# Patient Record
Sex: FEMALE | Race: WHITE | NOT HISPANIC OR LATINO | Employment: UNEMPLOYED | URBAN - METROPOLITAN AREA
[De-identification: names, ages, dates, MRNs, and addresses within clinical notes are randomized per-mention and may not be internally consistent; named-entity substitution may affect disease eponyms.]

---
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
## 2021-02-17 ENCOUNTER — TELEPHONE (OUTPATIENT)
Dept: PSYCHIATRY | Facility: CLINIC | Age: 21
End: 2021-02-17

## 2021-02-17 ENCOUNTER — OFFICE VISIT (OUTPATIENT)
Dept: PEDIATRICS CLINIC | Facility: CLINIC | Age: 21
End: 2021-02-17
Payer: COMMERCIAL

## 2021-02-17 VITALS
WEIGHT: 160 LBS | RESPIRATION RATE: 16 BRPM | SYSTOLIC BLOOD PRESSURE: 110 MMHG | DIASTOLIC BLOOD PRESSURE: 56 MMHG | HEIGHT: 63 IN | BODY MASS INDEX: 28.35 KG/M2 | HEART RATE: 92 BPM

## 2021-02-17 DIAGNOSIS — F41.9 ANXIETY: ICD-10-CM

## 2021-02-17 DIAGNOSIS — N94.6 DYSMENORRHEA: ICD-10-CM

## 2021-02-17 PROCEDURE — 3725F SCREEN DEPRESSION PERFORMED: CPT | Performed by: PEDIATRICS

## 2021-02-17 PROCEDURE — 96127 BRIEF EMOTIONAL/BEHAV ASSMT: CPT | Performed by: PEDIATRICS

## 2021-02-17 PROCEDURE — 1036F TOBACCO NON-USER: CPT | Performed by: PEDIATRICS

## 2021-02-17 PROCEDURE — 99214 OFFICE O/P EST MOD 30 MIN: CPT | Performed by: PEDIATRICS

## 2021-02-17 PROCEDURE — 3008F BODY MASS INDEX DOCD: CPT | Performed by: PEDIATRICS

## 2021-02-17 RX ORDER — DROSPIRENONE AND ETHINYL ESTRADIOL 0.02-3(28)
1 KIT ORAL DAILY
Qty: 28 TABLET | Refills: 1 | Status: SHIPPED | OUTPATIENT
Start: 2021-02-17 | End: 2021-04-25 | Stop reason: SDUPTHER

## 2021-02-17 RX ORDER — FLUOXETINE HYDROCHLORIDE 20 MG/1
20 CAPSULE ORAL DAILY
Qty: 30 CAPSULE | Refills: 0 | Status: CANCELLED
Start: 2021-02-17 | End: 2021-03-19

## 2021-02-17 NOTE — PATIENT INSTRUCTIONS
Prozac 20 mg, I would like to switch to zoloft 50 mg initially  For 3 days, please pour contents of half a capsule of prozac in soft food, and simulanteously take 1/2 zoloft pill  Then full zoloft pill and for 3 more days, 1/4 of the capsule once daily, then stop  If after 1-2 weeks your'e not feeling better, Zoloft to 100 mg is very appropriate

## 2021-02-20 NOTE — PROGRESS NOTES
Assessment/Plan:    Diagnoses and all orders for this visit:    Anxiety  -     sertraline (ZOLOFT) 50 mg tablet; Take 1 tablet (50 mg total) by mouth daily for 14 days    Dysmenorrhea  -     drospirenone-ethinyl estradiol (MARIUSZ) 3-0 02 MG per tablet; Take 1 tablet by mouth daily for 28 days    Other orders  -     Cancel: FLUoxetine (PROzac) 20 mg capsule; Take 1 capsule (20 mg total) by mouth daily          Patient Instructions   Prozac 20 mg, I would like to switch to zoloft 50 mg initially  For 3 days, please pour contents of half a capsule of prozac in soft food, and simulanteously take 1/2 zoloft pill  Then full zoloft pill and for 3 more days, 1/4 of the capsule once daily, then stop  If after 1-2 weeks your'e not feeling better, Zoloft to 100 mg is very appropriate  Subjective:     History provided by: patient    Patient ID: Brenda Toure is a 24 y o  female    Performed H &P with Delroy Curry alone  Two concerns today  OCP - on Orsythia since 2018 so going on 3 years, periods are starting to be painful again , cramping for 3-5 days very badly  Panic attacks - school (classes at [employer]) virtual are still a huge trigger  Was a little better initially with the prozac, now 20 mg, now not and starting to happen again  Denies suicidality or cutting  Fatigue is better, diet and exercise and sleep habits OK       The following portions of the patient's history were reviewed and updated as appropriate:   She  has no past medical history on file  She   Patient Active Problem List    Diagnosis Date Noted    Panic attack 07/16/2020    Anxiety 07/16/2020    Fatigue 07/16/2020     She  has a past surgical history that includes No past surgeries  Her family history includes Allergies in her family; Anemia in her mother; Asthma in her mother; Cancer in her family; Diabetes in her family; Heart disease in her family; Hypertension in her family;  No Known Problems in her father; Stroke in her family  She  reports that she has never smoked  She has never used smokeless tobacco  No history on file for alcohol and drug  Current Outpatient Medications   Medication Sig Dispense Refill    drospirenone-ethinyl estradiol (MARIUSZ) 3-0 02 MG per tablet Take 1 tablet by mouth daily for 28 days 28 tablet 1    Orsythia 0 1-20 MG-MCG per tablet TAKE ONE TABLET BY MOUTH EVERY  tablet 3    sertraline (ZOLOFT) 50 mg tablet Take 1 tablet (50 mg total) by mouth daily for 14 days 14 tablet 0     No current facility-administered medications for this visit  Current Outpatient Medications on File Prior to Visit   Medication Sig    Orsythia 0 1-20 MG-MCG per tablet TAKE ONE TABLET BY MOUTH EVERY DAY     No current facility-administered medications on file prior to visit  She has No Known Allergies       Review of Systems   Constitutional: Negative for activity change, appetite change, fatigue and fever  HENT: Negative for mouth sores  Eyes: Negative for discharge  Respiratory: Negative for shortness of breath  Gastrointestinal: Negative for diarrhea and vomiting  Genitourinary: Positive for menstrual problem  Musculoskeletal: Negative for arthralgias  Skin: Negative for rash  Psychiatric/Behavioral: Negative for sleep disturbance  The patient is nervous/anxious  All other systems reviewed and are negative  Objective:    Vitals:    02/17/21 1643   BP: 110/56   Pulse: 92   Resp: 16   Weight: 72 6 kg (160 lb)   Height: 5' 3 39" (1 61 m)       Physical Exam  Constitutional:       General: She is not in acute distress  Appearance: She is well-developed  HENT:      Head: Normocephalic  Eyes:      General:         Right eye: No discharge  Left eye: No discharge  Conjunctiva/sclera: Conjunctivae normal    Neck:      Musculoskeletal: Neck supple  Cardiovascular:      Rate and Rhythm: Normal rate and regular rhythm  Heart sounds: Normal heart sounds  No murmur  Pulmonary:      Effort: Pulmonary effort is normal  No respiratory distress  Breath sounds: Normal breath sounds  Abdominal:      Palpations: Abdomen is soft  Musculoskeletal: Normal range of motion  Lymphadenopathy:      Cervical: No cervical adenopathy  Skin:     Findings: No rash  Neurological:      Mental Status: She is alert and oriented to person, place, and time     Psychiatric:         Behavior: Behavior normal          Judgment: Judgment normal         I was in the room face to face with the patient  from 4:45 PM to 5:15 PM, 25 minutes were spent counselling

## 2021-02-28 DIAGNOSIS — F41.9 ANXIETY: ICD-10-CM

## 2021-03-23 DIAGNOSIS — F41.9 ANXIETY: ICD-10-CM

## 2021-04-25 DIAGNOSIS — F41.9 ANXIETY: ICD-10-CM

## 2021-04-25 DIAGNOSIS — N94.6 DYSMENORRHEA: ICD-10-CM

## 2021-04-26 RX ORDER — DROSPIRENONE AND ETHINYL ESTRADIOL 0.02-3(28)
1 KIT ORAL DAILY
Qty: 28 TABLET | Refills: 0 | Status: SHIPPED | OUTPATIENT
Start: 2021-04-26 | End: 2021-05-13 | Stop reason: SDUPTHER

## 2021-05-08 ENCOUNTER — TELEPHONE (OUTPATIENT)
Dept: PEDIATRICS CLINIC | Facility: CLINIC | Age: 21
End: 2021-05-08

## 2021-05-13 DIAGNOSIS — N94.6 DYSMENORRHEA: ICD-10-CM

## 2021-05-13 RX ORDER — DROSPIRENONE AND ETHINYL ESTRADIOL 0.02-3(28)
1 KIT ORAL DAILY
Qty: 84 TABLET | Refills: 0 | Status: SHIPPED | OUTPATIENT
Start: 2021-05-13 | End: 2021-07-14 | Stop reason: SDUPTHER

## 2021-05-20 DIAGNOSIS — F41.9 ANXIETY: ICD-10-CM

## 2021-07-14 DIAGNOSIS — N94.6 DYSMENORRHEA: ICD-10-CM

## 2021-07-14 DIAGNOSIS — F41.9 ANXIETY: ICD-10-CM

## 2021-07-14 RX ORDER — DROSPIRENONE AND ETHINYL ESTRADIOL 0.02-3(28)
1 KIT ORAL DAILY
Qty: 84 TABLET | Refills: 0 | OUTPATIENT
Start: 2021-07-14 | End: 2021-10-06

## 2021-07-14 NOTE — TELEPHONE ENCOUNTER
I advised Chele Strong that I was pretty positive all her meds have been refilled recently and she stated she did not think so

## 2021-07-14 NOTE — TELEPHONE ENCOUNTER
It seems I sent 3 month supply back in May  I will , this time, send 3 month supply WITH 3 refills for each if you could please let Chika Howard know  Thanks

## 2021-08-12 ENCOUNTER — TELEPHONE (OUTPATIENT)
Dept: OBGYN CLINIC | Facility: CLINIC | Age: 21
End: 2021-08-12

## 2021-08-12 ENCOUNTER — OFFICE VISIT (OUTPATIENT)
Dept: OBGYN CLINIC | Facility: CLINIC | Age: 21
End: 2021-08-12

## 2021-08-12 VITALS
SYSTOLIC BLOOD PRESSURE: 117 MMHG | WEIGHT: 172 LBS | HEART RATE: 94 BPM | HEIGHT: 63 IN | BODY MASS INDEX: 30.48 KG/M2 | DIASTOLIC BLOOD PRESSURE: 78 MMHG

## 2021-08-12 DIAGNOSIS — Z30.09 GENERAL COUNSELING AND ADVICE ON FEMALE CONTRACEPTION: ICD-10-CM

## 2021-08-12 DIAGNOSIS — Z11.3 SCREEN FOR STD (SEXUALLY TRANSMITTED DISEASE): ICD-10-CM

## 2021-08-12 DIAGNOSIS — Z30.41 ENCOUNTER FOR SURVEILLANCE OF CONTRACEPTIVE PILLS: Primary | ICD-10-CM

## 2021-08-12 DIAGNOSIS — N94.6 DYSMENORRHEA: ICD-10-CM

## 2021-08-12 PROCEDURE — 87591 N.GONORRHOEAE DNA AMP PROB: CPT | Performed by: NURSE PRACTITIONER

## 2021-08-12 PROCEDURE — 99202 OFFICE O/P NEW SF 15 MIN: CPT | Performed by: NURSE PRACTITIONER

## 2021-08-12 PROCEDURE — 87491 CHLMYD TRACH DNA AMP PROBE: CPT | Performed by: NURSE PRACTITIONER

## 2021-08-12 RX ORDER — DROSPIRENONE AND ETHINYL ESTRADIOL 0.02-3(28)
1 KIT ORAL DAILY
Qty: 84 TABLET | Refills: 11 | Status: SHIPPED | OUTPATIENT
Start: 2021-08-12 | End: 2022-08-09

## 2021-08-12 NOTE — PATIENT INSTRUCTIONS
Carole Ruiz as previously directed  STD results can take up to 2 weeks  Remember safe sex and condom use  Call with needs or concerns  Return in 1 year    COVID-19 Instructions    If you are having any of the following:  Cough   Shortness of breath   Fever  If traveled within past 2 weeks internationally or to high risk [place] states  Or been in contact with someone that has     Please call either:   Your PCP office  [phone], option [phone]    They will screen you over the phone and direct you to the nearest appropriate testing location    DO NOT go to your PCP or OB office without calling first

## 2021-08-12 NOTE — LETTER
2021    To Zac Biswas  : 2000      This letter is to advise you that your recent CULTURE results were reviewed by me and are NORMAL  Please contact the office for an appointment if you have any additional concerns      RAFY Pitts

## 2021-08-12 NOTE — PROGRESS NOTES
Assessment/Plan:         Diagnoses and all orders for this visit:    Encounter for surveillance of contraceptive pills  -     drospirenone-ethinyl estradiol (MARIUSZ) 3-0 02 MG per tablet; Take 1 tablet by mouth daily    General counseling and advice on female contraception    Dysmenorrhea  -     drospirenone-ethinyl estradiol (MARIUSZ) 3-0 02 MG per tablet; Take 1 tablet by mouth daily    Screen for STD (sexually transmitted disease)  -     Chlamydia/GC amplified DNA by Beckie Downey as previously directed  STD results can take up to 2 weeks  Remember safe sex and condom use  Call with needs or concerns  Return in 1 year  Pt verbalized understanding of all discussed  Subjective:      Patient ID: Katie Yu is a 24 y o  female  HPI  Pt states she presents as areferral from [address] [place] [address] Road to establish care and to continue birth control pills  Pt states she is taking Mariusz for a Hx of painful periods and for birth control  Pt states period are only 4-5 days and much less painful, pt would like to continue the method, safe and effective use reinforced    Reinforced safe sex and condom use  Pt has 1 partner x 4 months     The following portions of the patient's history were reviewed and updated as appropriate: allergies, current medications, past family history, past medical history, past social history, past surgical history and problem list     Review of Systems      Pertinent items are note in the HPI    Objective:      /78 (BP Location: Left arm, Patient Position: Sitting, Cuff Size: Standard)   Pulse 94   Ht 5' 3 39" (1 61 m)   Wt 78 kg (172 lb)   LMP 07/19/2021   BMI 30 09 kg/m²          Physical Exam  Vitals reviewed  Constitutional:       Appearance: Normal appearance  Eyes:      General:         Right eye: No discharge  Left eye: No discharge  Pulmonary:      Effort: Pulmonary effort is normal  No respiratory distress     Musculoskeletal:         General: Normal range of motion  Cervical back: Normal range of motion  Neurological:      Mental Status: She is alert and oriented to person, place, and time  Psychiatric:         Mood and Affect: Mood normal          Behavior: Behavior normal          Thought Content:  Thought content normal        Negative cough or SOB

## 2021-08-14 LAB
C TRACH DNA SPEC QL NAA+PROBE: NEGATIVE
N GONORRHOEA DNA SPEC QL NAA+PROBE: NEGATIVE

## 2021-08-19 DIAGNOSIS — Z30.41 ENCOUNTER FOR SURVEILLANCE OF CONTRACEPTIVE PILLS: Primary | ICD-10-CM

## 2021-08-19 NOTE — PROGRESS NOTES
Re: coverage for OCPs (all of the names listed below are chemically equivalent):    I called [facility] and they do not cover the following "brand names"  -Aundrea Do Yusef [address]    They do cover the following "brand names"  -Diamond Armenta (recently rebranded as Thea Gosselin, not for sale as Genevieve Rodríguez anymore)  -[address] [facility] "drosperinone-Ethinyl Estradiol 3-0 02mg"    I called [facility] and explained the situation  They do not have any of the "approved" medications on the shelf today but they will put in a request from their supplier until they find a brand name from the approved list that can be shipped to them  Shipping happens daily and new deliveries occur around 3-4 pm  Therefore we should call back tomorrow () after 3-4 pm and ask if they had any luck getting a shipment with one of the approved names from the list above      [facility]: [phone]

## 2021-09-30 ENCOUNTER — OFFICE VISIT (OUTPATIENT)
Dept: PEDIATRICS CLINIC | Facility: CLINIC | Age: 21
End: 2021-09-30
Payer: COMMERCIAL

## 2021-09-30 VITALS
DIASTOLIC BLOOD PRESSURE: 68 MMHG | SYSTOLIC BLOOD PRESSURE: 118 MMHG | WEIGHT: 177 LBS | BODY MASS INDEX: 31.36 KG/M2 | RESPIRATION RATE: 24 BRPM | HEART RATE: 80 BPM | HEIGHT: 63 IN

## 2021-09-30 DIAGNOSIS — Z13.0 SCREENING FOR DEFICIENCY ANEMIA: ICD-10-CM

## 2021-09-30 DIAGNOSIS — Z13.228 SCREENING FOR METABOLIC DISORDER: ICD-10-CM

## 2021-09-30 DIAGNOSIS — Z13.220 SCREENING FOR HYPERLIPIDEMIA: ICD-10-CM

## 2021-09-30 DIAGNOSIS — Z00.00 ENCOUNTER FOR WELL ADULT EXAM WITHOUT ABNORMAL FINDINGS: Primary | ICD-10-CM

## 2021-09-30 DIAGNOSIS — Z13.31 SCREENING FOR DEPRESSION: ICD-10-CM

## 2021-09-30 PROCEDURE — 99395 PREV VISIT EST AGE 18-39: CPT | Performed by: PEDIATRICS

## 2021-09-30 PROCEDURE — 96127 BRIEF EMOTIONAL/BEHAV ASSMT: CPT | Performed by: PEDIATRICS

## 2021-09-30 PROCEDURE — 92551 PURE TONE HEARING TEST AIR: CPT | Performed by: PEDIATRICS

## 2021-09-30 PROCEDURE — 99173 VISUAL ACUITY SCREEN: CPT | Performed by: PEDIATRICS

## 2021-11-24 ENCOUNTER — OFFICE VISIT (OUTPATIENT)
Dept: PEDIATRICS CLINIC | Facility: CLINIC | Age: 21
End: 2021-11-24
Payer: COMMERCIAL

## 2021-11-24 VITALS
DIASTOLIC BLOOD PRESSURE: 64 MMHG | RESPIRATION RATE: 16 BRPM | HEART RATE: 96 BPM | BODY MASS INDEX: 32.17 KG/M2 | SYSTOLIC BLOOD PRESSURE: 106 MMHG | TEMPERATURE: 98.4 F | WEIGHT: 181.8 LBS

## 2021-11-24 DIAGNOSIS — Z20.818 EXPOSURE TO STREP THROAT: Primary | ICD-10-CM

## 2021-11-24 DIAGNOSIS — J02.9 SORE THROAT: ICD-10-CM

## 2021-11-24 LAB — S PYO AG THROAT QL: NEGATIVE

## 2021-11-24 PROCEDURE — 1036F TOBACCO NON-USER: CPT | Performed by: PEDIATRICS

## 2021-11-24 PROCEDURE — 87070 CULTURE OTHR SPECIMN AEROBIC: CPT | Performed by: PEDIATRICS

## 2021-11-24 PROCEDURE — 87880 STREP A ASSAY W/OPTIC: CPT | Performed by: PEDIATRICS

## 2021-11-24 PROCEDURE — 99214 OFFICE O/P EST MOD 30 MIN: CPT | Performed by: PEDIATRICS

## 2021-11-24 RX ORDER — AMOXICILLIN 500 MG/1
500 CAPSULE ORAL EVERY 8 HOURS SCHEDULED
Qty: 30 CAPSULE | Refills: 0 | Status: SHIPPED | OUTPATIENT
Start: 2021-11-24 | End: 2021-12-04

## 2021-11-26 LAB — BACTERIA THROAT CULT: NORMAL

## 2022-02-22 ENCOUNTER — OFFICE VISIT (OUTPATIENT)
Dept: PEDIATRICS CLINIC | Facility: CLINIC | Age: 22
End: 2022-02-22
Payer: MEDICARE

## 2022-02-22 VITALS
DIASTOLIC BLOOD PRESSURE: 72 MMHG | HEART RATE: 92 BPM | BODY MASS INDEX: 34.38 KG/M2 | WEIGHT: 194 LBS | SYSTOLIC BLOOD PRESSURE: 110 MMHG | RESPIRATION RATE: 20 BRPM | HEIGHT: 63 IN

## 2022-02-22 DIAGNOSIS — F41.9 ANXIETY: ICD-10-CM

## 2022-02-22 PROCEDURE — 99213 OFFICE O/P EST LOW 20 MIN: CPT | Performed by: PEDIATRICS

## 2022-02-22 NOTE — PROGRESS NOTES
Assessment/Plan:    Diagnoses and all orders for this visit:    Anxiety  -     sertraline (ZOLOFT) 50 mg tablet; Take 2 tablets (100 mg total) by mouth daily          Patient Instructions   Gayrortega Cotto, I am sorry you are feeling stress  Panic attacks  Let's increase Zoloft to 100 milligrams , I have sent #60 with 1 refill to the pharmacy  IF after 7 days you still have the attacks, very safe to increase to 150   Subjective:     History provided by: patient    Patient ID: Jeffry Mike is a 22 y o  female    Gary Cotto is having panic attacks , more recently more than usual    Related to applying to nursing school  1-2 times a week , [employer] work is stressful , "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 "      Hear in March about [employer] nursing school       The following portions of the patient's history were reviewed and updated as appropriate:   She  has no past medical history on file  She   Patient Active Problem List    Diagnosis Date Noted    Panic attack 07/16/2020    Anxiety 07/16/2020    Fatigue 07/16/2020     She  has a past surgical history that includes No past surgeries  Her family history includes Allergies in her family; Anemia in her mother; Asthma in her mother; Cancer in her family; Diabetes in her family; Heart disease in her family; Hypertension in her family; No Known Problems in her father; Stroke in her family  She  reports that she has never smoked  She has never used smokeless tobacco  No history on file for alcohol use and drug use  Current Outpatient Medications   Medication Sig Dispense Refill    drospirenone-ethinyl estradiol (MARIUSZ) 3-0 02 MG per tablet Take 1 tablet by mouth daily 84 tablet 11    sertraline (ZOLOFT) 50 mg tablet Take 2 tablets (100 mg total) by mouth daily 60 tablet 1     No current facility-administered medications for this visit       Current Outpatient Medications on File Prior to Visit   Medication Sig    drospirenone-ethinyl estradiol (MARIUSZ) 3-0 02 MG per tablet Take 1 tablet by mouth daily     No current facility-administered medications on file prior to visit  She has No Known Allergies       Review of Systems   Constitutional: Negative for activity change, appetite change, fatigue and fever  HENT: Negative for mouth sores  Eyes: Negative for discharge  Respiratory: Negative for shortness of breath  Gastrointestinal: Negative for diarrhea and vomiting  Musculoskeletal: Negative for arthralgias  Skin: Negative for rash  Psychiatric/Behavioral: Positive for dysphoric mood  Negative for self-injury, sleep disturbance and suicidal ideas  The patient is nervous/anxious  All other systems reviewed and are negative  Objective:    Vitals:    02/22/22 1709   BP: 110/72   BP Location: Left arm   Patient Position: Sitting   Pulse: 92   Resp: 20   Weight: 88 kg (194 lb)   Height: 5' 3 03" (1 601 m)       Physical Exam  Constitutional:       Appearance: She is well-developed  HENT:      Head: Normocephalic  Eyes:      Conjunctiva/sclera: Conjunctivae normal    Pulmonary:      Effort: Pulmonary effort is normal    Abdominal:      General: There is no distension  Musculoskeletal:         General: Normal range of motion  Cervical back: Normal range of motion  Skin:     Findings: No rash  Neurological:      Mental Status: She is alert and oriented to person, place, and time

## 2022-02-22 NOTE — PATIENT INSTRUCTIONS
Pratima Salamanca am sorry you are feeling stress  Panic attacks  Let's increase Zoloft to 100 milligrams , I have sent #60 with 1 refill to the pharmacy  IF after 7 days you still have the attacks, very safe to increase to 150

## 2022-03-29 DIAGNOSIS — F41.9 ANXIETY: ICD-10-CM

## 2022-03-31 DIAGNOSIS — F41.9 ANXIETY: Primary | ICD-10-CM

## 2022-03-31 RX ORDER — SERTRALINE HYDROCHLORIDE 100 MG/1
100 TABLET, FILM COATED ORAL DAILY
Qty: 90 TABLET | Refills: 0 | Status: SHIPPED | OUTPATIENT
Start: 2022-03-31 | End: 2022-06-13

## 2022-04-06 ENCOUNTER — OFFICE VISIT (OUTPATIENT)
Dept: PEDIATRICS CLINIC | Facility: CLINIC | Age: 22
End: 2022-04-06
Payer: MEDICARE

## 2022-04-06 VITALS
HEART RATE: 84 BPM | SYSTOLIC BLOOD PRESSURE: 100 MMHG | DIASTOLIC BLOOD PRESSURE: 60 MMHG | BODY MASS INDEX: 33.91 KG/M2 | WEIGHT: 191.4 LBS | RESPIRATION RATE: 16 BRPM | HEIGHT: 63 IN

## 2022-04-06 DIAGNOSIS — Z02.89 HISTORY AND PHYSICAL EXAMINATION, OCCUPATION: Primary | ICD-10-CM

## 2022-04-06 PROCEDURE — 99212 OFFICE O/P EST SF 10 MIN: CPT | Performed by: PEDIATRICS

## 2022-04-08 NOTE — PATIENT INSTRUCTIONS
Sima Jacobsen on your admission into [address] ! I am glad that you are tolerating the medication well as well       Please reach out if you need anything else

## 2022-04-08 NOTE — PROGRESS NOTES
Assessment/Plan:    There are no diagnoses linked to this encounter  Patient Instructions   Louie Hill, congratulations on your admission into [facility] ! I am glad that you are tolerating the medication well as well   Please reach out if you need anything else            Subjective:     History provided by: patient    Patient ID: Manuelito Roman is a 22 y o  female    Is up to date on immunizations , here for H&P for [employer] [facility] [employer]!!!   "I need a TB serum test but they told me to go through [facility] "    No concerns today      The following portions of the patient's history were reviewed and updated as appropriate:   She  has no past medical history on file  She   Patient Active Problem List    Diagnosis Date Noted    Panic attack 07/16/2020    Anxiety 07/16/2020    Fatigue 07/16/2020     She  has a past surgical history that includes No past surgeries  Her family history includes Allergies in her family; Anemia in her mother; Asthma in her mother; Cancer in her family; Diabetes in her family; Heart disease in her family; Hypertension in her family; No Known Problems in her father; Stroke in her family  She  reports that she has never smoked  She has never used smokeless tobacco  No history on file for alcohol use and drug use  Current Outpatient Medications   Medication Sig Dispense Refill    drospirenone-ethinyl estradiol (MARIUSZ) 3-0 02 MG per tablet Take 1 tablet by mouth daily 84 tablet 11    sertraline (Zoloft) 100 mg tablet Take 1 tablet (100 mg total) by mouth daily 90 tablet 0     No current facility-administered medications for this visit  Current Outpatient Medications on File Prior to Visit   Medication Sig    drospirenone-ethinyl estradiol (MARIUSZ) 3-0 02 MG per tablet Take 1 tablet by mouth daily    sertraline (Zoloft) 100 mg tablet Take 1 tablet (100 mg total) by mouth daily     No current facility-administered medications on file prior to visit  She has No Known Allergies       Review of Systems   Constitutional: Negative for activity change, appetite change, fatigue and fever  HENT: Negative for congestion and mouth sores  Eyes: Negative for discharge  Respiratory: Negative for cough and shortness of breath  Gastrointestinal: Negative for diarrhea and vomiting  Musculoskeletal: Negative for arthralgias  Skin: Negative for rash  Psychiatric/Behavioral: Negative for sleep disturbance  All other systems reviewed and are negative  Objective:    Vitals:    04/06/22 1349   BP: 100/60   Pulse: 84   Resp: 16   Weight: 86 8 kg (191 lb 6 4 oz)   Height: 5' 3 11" (1 603 m)       Physical Exam  Constitutional:       Appearance: She is well-developed  HENT:      Head: Normocephalic  Eyes:      Conjunctiva/sclera: Conjunctivae normal    Pulmonary:      Effort: Pulmonary effort is normal    Abdominal:      General: There is no distension  Musculoskeletal:         General: Normal range of motion  Cervical back: Normal range of motion  Skin:     Findings: No rash  Neurological:      Mental Status: She is alert and oriented to person, place, and time

## 2022-04-13 ENCOUNTER — OFFICE VISIT (OUTPATIENT)
Dept: OBGYN CLINIC | Facility: CLINIC | Age: 22
End: 2022-04-13

## 2022-04-13 VITALS
BODY MASS INDEX: 33.31 KG/M2 | DIASTOLIC BLOOD PRESSURE: 83 MMHG | SYSTOLIC BLOOD PRESSURE: 116 MMHG | HEIGHT: 63 IN | HEART RATE: 101 BPM | WEIGHT: 188 LBS

## 2022-04-13 DIAGNOSIS — N92.6 IRREGULAR MENSES: Primary | ICD-10-CM

## 2022-04-13 LAB — SL AMB POCT URINE HCG: NORMAL

## 2022-04-13 PROCEDURE — 81025 URINE PREGNANCY TEST: CPT | Performed by: NURSE PRACTITIONER

## 2022-04-13 PROCEDURE — 99213 OFFICE O/P EST LOW 20 MIN: CPT | Performed by: NURSE PRACTITIONER

## 2022-04-13 NOTE — PROGRESS NOTES
Assessment/Plan:         Diagnoses and all orders for this visit:    Irregular menses  -     POCT urine HCG      Plan  Continue birth control pills as previously prescribed  Call with needs or concerns  Annual GYN exam and PAP are due after 8/21/2022  Pt verbalized understanding of all discussed  Subjective:      Patient ID: Mariah Cortez is a 22 y o  female  HPI  Pt states she has been taking OCP's as directed, had a normal period the end of March and has been having intermittent spotting since    Explained UPT is negative, to continue OCP's as previously directed and to call with continued concerns    The following portions of the patient's history were reviewed and updated as appropriate: allergies, current medications, past family history, past medical history, past social history, past surgical history and problem list     Review of Systems      Pertinent items are note in the HPI    Objective:      /83   Pulse 101   Ht 5' 3" (1 6 m)   Wt 85 3 kg (188 lb)   LMP 03/28/2022   BMI 33 30 kg/m²          Physical Exam  Vitals reviewed  Constitutional:       Appearance: Normal appearance  Eyes:      General:         Right eye: No discharge  Left eye: No discharge  Pulmonary:      Effort: Pulmonary effort is normal  No respiratory distress  Musculoskeletal:         General: Normal range of motion  Cervical back: Normal range of motion  Neurological:      Mental Status: She is alert and oriented to person, place, and time  Psychiatric:         Mood and Affect: Mood normal          Behavior: Behavior normal          Thought Content:  Thought content normal        Negative cough or SOB  Negative UPT

## 2022-04-13 NOTE — PATIENT INSTRUCTIONS
Continue birth control pills as previously prescribed  Call with needs or concerns  Annual GYN exam and PAP are due after 8/21/2022    COVID-19 Instructions    If you are having any of the following:  Cough   Shortness of breath   Fever  If traveled within past 2 weeks internationally or to high risk [place] states  Or been in contact with someone that has     Please call either:   Your PCP office  [phone], option [phone]    They will screen you over the phone and direct you to the nearest appropriate testing location  DO NOT go to your PCP or OB office without calling first     Thank you for your confidence in our team    We appreciate you and welcome your feedback  If you receive a survey from us, please take a few moments to let us know how we are doing     Sincerely,  RAFY Colunga

## 2022-04-18 ENCOUNTER — APPOINTMENT (OUTPATIENT)
Dept: LAB | Facility: CLINIC | Age: 22
End: 2022-04-18

## 2022-04-18 ENCOUNTER — OCCMED (OUTPATIENT)
Dept: URGENT CARE | Facility: CLINIC | Age: 22
End: 2022-04-18

## 2022-04-18 DIAGNOSIS — Z13.9 SCREENING FOR CONDITION: ICD-10-CM

## 2022-04-18 DIAGNOSIS — Z13.9 SCREENING FOR CONDITION: Primary | ICD-10-CM

## 2022-04-18 PROCEDURE — 36415 COLL VENOUS BLD VENIPUNCTURE: CPT

## 2022-04-18 PROCEDURE — 86480 TB TEST CELL IMMUN MEASURE: CPT

## 2022-04-19 LAB
GAMMA INTERFERON BACKGROUND BLD IA-ACNC: 0.02 IU/ML
M TB IFN-G BLD-IMP: NEGATIVE
M TB IFN-G CD4+ BCKGRND COR BLD-ACNC: 0 IU/ML
M TB IFN-G CD4+ BCKGRND COR BLD-ACNC: 0.01 IU/ML
MITOGEN IGNF BCKGRD COR BLD-ACNC: >10 IU/ML

## 2022-04-25 ENCOUNTER — TELEPHONE (OUTPATIENT)
Dept: PSYCHIATRY | Facility: CLINIC | Age: 22
End: 2022-04-25

## 2022-06-11 DIAGNOSIS — F41.9 ANXIETY: ICD-10-CM

## 2022-06-13 RX ORDER — SERTRALINE HYDROCHLORIDE 100 MG/1
TABLET, FILM COATED ORAL
Qty: 90 TABLET | Refills: 0 | Status: SHIPPED | OUTPATIENT
Start: 2022-06-13 | End: 2022-06-27 | Stop reason: SDUPTHER

## 2022-06-13 NOTE — TELEPHONE ENCOUNTER
Attempted to call Florent[facility]'s number on file and it says no routes found  Left message for mom to have Bel Dose call us

## 2022-06-27 ENCOUNTER — TELEMEDICINE (OUTPATIENT)
Dept: PEDIATRICS CLINIC | Facility: CLINIC | Age: 22
End: 2022-06-27
Payer: MEDICARE

## 2022-06-27 DIAGNOSIS — F41.9 ANXIETY: Primary | ICD-10-CM

## 2022-06-27 DIAGNOSIS — G43.829 MENSTRUAL MIGRAINE WITHOUT STATUS MIGRAINOSUS, NOT INTRACTABLE: ICD-10-CM

## 2022-06-27 DIAGNOSIS — Z13.31 ENCOUNTER FOR SCREENING FOR DEPRESSION: ICD-10-CM

## 2022-06-27 PROCEDURE — 99214 OFFICE O/P EST MOD 30 MIN: CPT | Performed by: PEDIATRICS

## 2022-06-27 PROCEDURE — 96127 BRIEF EMOTIONAL/BEHAV ASSMT: CPT | Performed by: PEDIATRICS

## 2022-06-27 RX ORDER — SERTRALINE HYDROCHLORIDE 100 MG/1
100 TABLET, FILM COATED ORAL DAILY
Qty: 90 TABLET | Refills: 1 | Status: SHIPPED | OUTPATIENT
Start: 2022-06-27

## 2022-06-28 NOTE — PATIENT INSTRUCTIONS
I am so proud of you, Ericka Doing, for getting into [facility]! That is wonderful! I have refilled zoloft for you but we have room to increase if anxiety worsens  Please call your Gyn about your menstrual migraines  I have also put in a referral to neurology for these headaches that run in your family  [id number] [facility] or [address] [facility] for adult medicine

## 2022-06-28 NOTE — PROGRESS NOTES
Virtual Regular Visit    Verification of patient location:    Patient is located in the following state in which I hold an active license [place]      Assessment/Plan:    Problem List Items Addressed This Visit        Other    Anxiety - Primary    Relevant Medications    sertraline (ZOLOFT) 100 mg tablet      Other Visit Diagnoses     Menstrual migraine without status migrainosus, not intractable        Relevant Medications    sertraline (ZOLOFT) 100 mg tablet    Other Relevant Orders    Ambulatory Referral to Neurology    Encounter for screening for depression            Patient Instructions   I am so proud of you, Tino Gray, for getting into [facility]! That is wonderful! I have refilled zoloft for you but we have room to increase if anxiety worsens  Please call your Gyn about your menstrual migraines  I have also put in a referral to neurology for these headaches that run in your family  [address] or [address] for adult medicine  Reason for visit is   Chief Complaint   Patient presents with    Virtual Regular Visit        Encounter provider Lucas Blair MD    Provider located at [address] [place] [address] [address] [facility]  [phone]      Recent Visits  No visits were found meeting these conditions  Showing recent visits within past 7 days and meeting all other requirements  Today's Visits  Date Type Provider Dept   06/27/22 Telemedicine MD Daniel Kathleen   Showing today's visits and meeting all other requirements  Future Appointments  No visits were found meeting these conditions  Showing future appointments within next 150 days and meeting all other requirements       The patient was identified by name and date of birth   Anh Mcneill was informed that this is a telemedicine visit and that the visit is being conducted through [facility] and patient was informed this is a secure, HIPAA-complaint platform  She agrees to proceed     My office door was closed  No one else was in the room  She acknowledged consent and understanding of privacy and security of the video platform  The patient has agreed to participate and understands they can discontinue the visit at any time  Patient is aware this is a billable service  Subjective  Maria Fernanda Magana is a 22 y o  female here via video visit to discuss a few issues   Jerrol Bence is doing well in [employer] [employer]! She feels the zoloft at 100mg is helpful, feels less anxious, fewer panic attacks  Even though anxiety is not resolved, she does not want to increase to higher dose at this point  She also gets migraine HAs on first 2 days of her period and needs to stay in a dark, quiet room  She is on OCPs  This runs in her family  She will reach out to her Gyn about this  No HA now  No visual issues  No ass'c vomiting  She is almost 22 and needs to find adult doctor  History reviewed  No pertinent past medical history  Past Surgical History:   Procedure Laterality Date    NO PAST SURGERIES         Current Outpatient Medications   Medication Sig Dispense Refill    sertraline (ZOLOFT) 100 mg tablet Take 1 tablet (100 mg total) by mouth daily 90 tablet 1    drospirenone-ethinyl estradiol (MARIUSZ) 3-0 02 MG per tablet Take 1 tablet by mouth daily 84 tablet 11     No current facility-administered medications for this visit  No Known Allergies    Review of Systems   Constitutional: Negative  Negative for fever  HENT: Negative for dental problem, ear pain, nosebleeds and sore throat  Eyes: Negative for visual disturbance  Respiratory: Negative for cough and shortness of breath  Cardiovascular: Negative for chest pain and palpitations  Gastrointestinal: Negative for abdominal pain, constipation, diarrhea, nausea and vomiting  Endocrine: Negative for polyuria  Genitourinary: Negative for dysuria  Musculoskeletal: Negative for gait problem and myalgias  Skin: Negative for rash  Allergic/Immunologic: Negative for immunocompromised state  Neurological: Positive for headaches  Negative for dizziness and weakness  Hematological: Negative for adenopathy  Psychiatric/Behavioral: Negative for behavioral problems, dysphoric mood, self-injury, sleep disturbance and suicidal ideas  The patient is nervous/anxious  Video Exam    There were no vitals filed for this visit  Physical Exam  Constitutional:       Appearance: Normal appearance  Comments: happy   HENT:      Head: Normocephalic and atraumatic  Right Ear: External ear normal       Left Ear: External ear normal       Nose: Nose normal       Mouth/Throat:      Mouth: Mucous membranes are moist    Eyes:      Conjunctiva/sclera: Conjunctivae normal    Cardiovascular:      Comments: No cyanosis  Pulmonary:      Effort: Pulmonary effort is normal    Abdominal:      Tenderness: There is no abdominal tenderness  Musculoskeletal:         General: Normal range of motion  Cervical back: Normal range of motion  Skin:     Findings: No rash  Neurological:      General: No focal deficit present  Mental Status: She is alert  Psychiatric:         Mood and Affect: Mood normal           I spent 20 minutes directly with the patient during this visit    VIRTUAL VISIT [facility] verbally agrees to participate in [facility]  Pt is aware that [facility] could be limited without vital signs or the ability to perform a full hands-on physical [place] Search understands she or the provider may request at any time to terminate the video visit and request the patient to seek care or treatment in person

## 2022-07-26 ENCOUNTER — OFFICE VISIT (OUTPATIENT)
Dept: OBGYN CLINIC | Facility: CLINIC | Age: 22
End: 2022-07-26

## 2022-07-26 VITALS
HEART RATE: 83 BPM | BODY MASS INDEX: 32.6 KG/M2 | SYSTOLIC BLOOD PRESSURE: 121 MMHG | HEIGHT: 63 IN | WEIGHT: 184 LBS | DIASTOLIC BLOOD PRESSURE: 82 MMHG

## 2022-07-26 DIAGNOSIS — R51.9 NONINTRACTABLE HEADACHE, UNSPECIFIED CHRONICITY PATTERN, UNSPECIFIED HEADACHE TYPE: Primary | ICD-10-CM

## 2022-07-26 DIAGNOSIS — Z11.3 SCREEN FOR STD (SEXUALLY TRANSMITTED DISEASE): ICD-10-CM

## 2022-07-26 DIAGNOSIS — Z30.41 ENCOUNTER FOR SURVEILLANCE OF CONTRACEPTIVE PILLS: ICD-10-CM

## 2022-07-26 PROCEDURE — 87591 N.GONORRHOEAE DNA AMP PROB: CPT | Performed by: NURSE PRACTITIONER

## 2022-07-26 PROCEDURE — 87491 CHLMYD TRACH DNA AMP PROBE: CPT | Performed by: NURSE PRACTITIONER

## 2022-07-26 PROCEDURE — 99213 OFFICE O/P EST LOW 20 MIN: CPT | Performed by: NURSE PRACTITIONER

## 2022-07-26 RX ORDER — ACETAMINOPHEN AND CODEINE PHOSPHATE 120; 12 MG/5ML; MG/5ML
1 SOLUTION ORAL DAILY
Qty: 28 TABLET | Refills: 11 | Status: SHIPPED | OUTPATIENT
Start: 2022-07-26 | End: 2022-08-09 | Stop reason: ALTCHOICE

## 2022-07-26 NOTE — PROGRESS NOTES
Assessment/Plan:         Diagnoses and all orders for this visit:    Nonintractable headache, unspecified chronicity pattern, unspecified headache type  -     norethindrone (Ortho Micronor) 0 35 MG tablet; Take 1 tablet (0 35 mg total) by mouth daily    Encounter for surveillance of contraceptive pills  -     norethindrone (Ortho Micronor) 0 35 MG tablet; Take 1 tablet (0 35 mg total) by mouth daily      Plan  Start Micronor as directed  Schedule 3 months follow up birth control pill change  Call with needs or concerns  Keep Family Practice and neurology appointments  Annual GYN exam is due after 8/21/2022  Pt verbalized understanding of all discussed  Subjective:      Patient ID: Clive Briones is a 22 y o  female  HPI   Pt was sent as a referral for a Hx of H/A starting 1 month after Robyn start  Pt was provided a Neurology consult by [facility], not schedule until December  Pt states Excedrin helps relieve H/A  Pt states she is not sure if a change in OCP's would help  Safe and effective use of Micronor was provided  /82    The following portions of the patient's history were reviewed and updated as appropriate: allergies, current medications, past family history, past medical history, past social history, past surgical history and problem list     Review of Systems      Pertinent items are note in the HPI    Objective:      /82   Pulse 83   Ht 5' 3" (1 6 m)   Wt 83 5 kg (184 lb)   LMP 07/18/2022   BMI 32 59 kg/m²          Physical Exam  Vitals reviewed  Constitutional:       Appearance: Normal appearance  Eyes:      General:         Right eye: No discharge  Left eye: No discharge  Pulmonary:      Effort: Pulmonary effort is normal  No respiratory distress  Musculoskeletal:         General: Normal range of motion  Cervical back: Normal range of motion  Neurological:      Mental Status: She is alert and oriented to person, place, and time     Psychiatric: Mood and Affect: Mood normal          Behavior: Behavior normal          Thought Content:  Thought content normal        negative cough or SOB

## 2022-07-26 NOTE — PATIENT INSTRUCTIONS
Start Micronor as directed  Schedule 3 months follow up birth control pill change  Call with needs or concerns  Keep Family Practice and neurology appointments  Annual GYN exam is due after 8/21/2022    COVID-19 Instructions    If you are having any of the following:  Cough   Shortness of breath   Fever  If traveled within past 2 weeks internationally or to high risk [place] states  Or been in contact with someone that has     Please call either:   Your PCP office  [phone], option [phone]    They will screen you over the phone and direct you to the nearest appropriate testing location    DO NOT go to your PCP or OB office without calling first       Franci Simmons

## 2022-07-26 NOTE — LETTER
2022    To Alley Biswas  : 2000      This letter is to advise you that your recent CULTURES for gonorrhea, chlamydia results were reviewed by me and are NORMAL  Please contact the office for an appointment if you have any additional concerns      RAFY Allen

## 2022-07-27 LAB
C TRACH DNA SPEC QL NAA+PROBE: NEGATIVE
N GONORRHOEA DNA SPEC QL NAA+PROBE: NEGATIVE

## 2022-08-09 ENCOUNTER — OFFICE VISIT (OUTPATIENT)
Dept: FAMILY MEDICINE CLINIC | Facility: CLINIC | Age: 22
End: 2022-08-09
Payer: MEDICARE

## 2022-08-09 VITALS
HEIGHT: 63 IN | OXYGEN SATURATION: 98 % | SYSTOLIC BLOOD PRESSURE: 104 MMHG | DIASTOLIC BLOOD PRESSURE: 70 MMHG | BODY MASS INDEX: 32.43 KG/M2 | WEIGHT: 183 LBS | HEART RATE: 88 BPM | RESPIRATION RATE: 16 BRPM | TEMPERATURE: 98 F

## 2022-08-09 DIAGNOSIS — Z00.00 ENCOUNTER FOR WELLNESS EXAMINATION IN ADULT: Primary | ICD-10-CM

## 2022-08-09 DIAGNOSIS — G43.829 MENSTRUAL MIGRAINE WITHOUT STATUS MIGRAINOSUS, NOT INTRACTABLE: ICD-10-CM

## 2022-08-09 DIAGNOSIS — N94.6 DYSMENORRHEA: ICD-10-CM

## 2022-08-09 DIAGNOSIS — F41.9 ANXIETY: ICD-10-CM

## 2022-08-09 PROCEDURE — 99385 PREV VISIT NEW AGE 18-39: CPT | Performed by: FAMILY MEDICINE

## 2022-08-09 RX ORDER — RIZATRIPTAN BENZOATE 10 MG/1
10 TABLET, ORALLY DISINTEGRATING ORAL AS NEEDED
Qty: 18 TABLET | Refills: 1 | Status: SHIPPED | OUTPATIENT
Start: 2022-08-09

## 2022-08-09 NOTE — PROGRESS NOTES
FAMILY PRACTICE OFFICE VISIT       NAME: Liana Biswas  AGE: 22 y o  SEX: female       : 2000        MRN: [id number]        Assessment and Plan     1  Encounter for wellness examination in adult  -     Ambulatory referral to Obstetrics / Gynecology; Future    2  Anxiety  Assessment & Plan:  Symptoms have significantly improved with start of Zoloft, current dose is 100 mg daily  Panic attacks have disappeared  Continue same medication      3  Menstrual migraine without status migrainosus, not intractable  Assessment & Plan:  No aura  No daily symptoms  Exam is normal   Premenstrual migraine, lasting 1 to 2 days, partially relieved by Excedrin  I recommend trial of Maxalt along with ibuprofen p r n  For headache   No indication for daily preventative therapy  Patient is aware that lower dose of OCP might improve frequency and severity of headaches but firmly declines to switch her birth control pill formulation at present time  She is very reluctant to start Micronor that was suggested by [facility] and is proceeding with evaluation by a different gynecologist    Pending follow-up with [facility] in November    Orders:  -     rizatriptan (Maxalt-MLT) 10 mg disintegrating tablet; Take 1 tablet (10 mg total) by mouth as needed for migraine Take at the onset of migraine; if symptoms persists may take another dose at least 2 hours after first dose  Take no more than 2 doses in a day  4  Dysmenorrhea  Assessment & Plan:  History of longstanding severe dysmenorrhea  Patient states that she has tried numerous OCPs and has noticed significant improvement with start of yes  She has developed symptoms of menstrual migraines within past few months  Patient states that she firmly prefers to continue on yes therapy since headaches are not severe and menstrual cramping was very burden some  She is scheduled for follow-up with gyn and neurology           BMI Counseling:  Body mass index is 32 42 kg/m²  The BMI is above normal  Nutrition recommendations include encouraging healthy choices of fruits and vegetables, consuming healthier snacks, moderation in carbohydrate intake, reducing intake of saturated and trans fat and reducing intake of cholesterol  Exercise recommendations include exercising 3-5 times per week  No pharmacotherapy was ordered  Patient referred to PCP  Rationale for BMI follow-up plan is due to patient being overweight or obese  Depression Screening and Follow-up Plan: Patient was screened for depression during today's encounter  They screened negative with a PHQ-2 score of 0  There are no Patient Instructions on file for this visit  Return in about 1 year (around 8/9/2023) for Annual physical/well exam     Discussed with the patient and all questioned fully answered  She will call me if any problems arise  M*Modal software was used to dictate this note  It may contain errors with dictating incorrect words/spelling  Please contact provider directly with any questions  Chief Complaint     Chief Complaint   Patient presents with    Establish Care    Migraine     Will be seeing  Neuro in November to establish care  This was the first available appt  History of Present Illness     New patient to the practice, she is transferring care from [address] [place] [address] standing h/o anxiety  Improved with start of Zoloft  and especially after dose was increased to 100 mg daily  No panic attacks lately, patient is very pleased with sertraline therapy  Under some stress- nursing school student, 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 and they have suggested to discontinue as and switch her to Micronor due to recent onset of migraines  Patient strongly declines this recommendation as her headaches are not severe, strictly premenstrual   Patient states that her burden due to dysmenorrhea is significantly worse than headaches  She is proceeding with 2nd opinion gyn consultation and is scheduled for evaluation with [facility]  Office visit with Neurology is scheduled in November  Migraines became more prominent within last year after start of yes  Patient denies aura  She reports occasional nausea and photophobia associated with migraines  Migraine usually last 1 or 2 days  Patient takes Excedrin and it helps somewhat  Patient reports family history of migraine headaches  She is up-to-date with all routine immunizations  Migraine        Review of Systems   Review of Systems   Constitutional: Negative  HENT: Negative  Eyes: Negative  Respiratory: Negative  Cardiovascular: Negative  Gastrointestinal: Negative  Endocrine: Negative  Genitourinary: Positive for menstrual problem (As per HPI)  Musculoskeletal: Negative  Skin: Negative  Allergic/Immunologic: Negative  Neurological: Positive for headaches  Hematological: Negative  Psychiatric/Behavioral: The patient is nervous/anxious          Active Problem List     Patient Active Problem List   Diagnosis    Panic attack    Anxiety    Fatigue    Dysmenorrhea    Menstrual migraine without status migrainosus, not intractable       Past Medical History:  Past Medical History:   Diagnosis Date    Migraine aura without headache        Past Surgical History:  Past Surgical History:   Procedure Laterality Date    NO PAST SURGERIES         Family History:  Family History   Problem Relation Age of Onset    Anemia Mother     Asthma Mother     No Known Problems Father     Allergies Family     Cancer Family     Diabetes Family         mellitus    Heart disease Family     Hypertension Family     Stroke Family        Social History:  Social History     Socioeconomic History    Marital status: Single     Spouse name: Not on file    Number of children: Not on file    Years of education: Not on file    Highest education level: Not on file   Occupational History    Not on file   Tobacco Use    Smoking status: Never Smoker    Smokeless tobacco: Never Used   Vaping Use    Vaping Use: Never used   Substance and Sexual Activity    Alcohol use: Not Currently    Drug use: Never    Sexual activity: Not Currently     Partners: Male     Birth control/protection: OCP   Other Topics Concern    Not on file   Social History Narrative    Living with single parent-Lives with mother,mother's fiance,patient's brother,and the son of mother's fimelissa     Social Determinants of Health     Financial Resource Strain: Low Risk     Difficulty of Paying Living Expenses: Not hard at all   Food Insecurity: No Food Insecurity    Worried About Running Out of Food in the Last Year: Never true    [address] in the Last Year: Never true   Transportation Needs: No Transportation Needs    Lack of Transportation (Medical): No    Lack of Transportation (Non-Medical):  No   Physical Activity: Not on file   Stress: Not on file   Social Connections: Not on file   Intimate Partner Violence: Not on file   Housing Stability: Low Risk     Unable to Pay for Housing in the Last Year: No    Number of Places Lived in the Last Year: 1    Unstable Housing in the Last Year: No         Objective     Vitals:    08/09/22 1155   BP: 104/70   BP Location: Right arm   Patient Position: Sitting   Cuff Size: Large   Pulse: 88   Resp: 16   Temp: 98 °F (36 7 °C)   TempSrc: Temporal   SpO2: 98%   Weight: 83 kg (183 lb)   Height: 5' 3" (1 6 m)       Wt Readings from Last 3 Encounters:   08/09/22 83 kg (183 lb)   07/26/22 83 5 kg (184 lb)   04/13/22 85 3 kg (188 lb)       Physical Exam  Vitals and nursing note reviewed  Constitutional:       General: She is not in acute distress  Appearance: Normal appearance  She is well-developed  She is not ill-appearing  HENT:      Head: Normocephalic and atraumatic  Eyes:      General: No scleral icterus  Conjunctiva/sclera: Conjunctivae normal    Neck:      Thyroid: No thyromegaly  Vascular: No carotid bruit  Cardiovascular:      Rate and Rhythm: Normal rate and regular rhythm  Heart sounds: Normal heart sounds  No murmur heard  Pulmonary:      Effort: Pulmonary effort is normal  No respiratory distress  Breath sounds: Normal breath sounds  No wheezing  Abdominal:      General: There is no distension or abdominal bruit  Palpations: Abdomen is soft  Tenderness: There is no abdominal tenderness  Hernia: No hernia is present  Musculoskeletal:         General: Normal range of motion  Cervical back: Neck supple  No rigidity  Right lower leg: No edema  Left lower leg: No edema  Skin:     General: Skin is warm  Neurological:      General: No focal deficit present  Mental Status: She is alert and oriented to person, place, and time  Cranial Nerves: No cranial nerve deficit  Coordination: Coordination normal    Psychiatric:         Mood and Affect: Mood normal          Behavior: Behavior normal          Thought Content:  Thought content normal           Pertinent Laboratory/Diagnostic Studies:    No results found for: WBC, HGB, HCT, MCV, PLT    No results found for: TSH    No results found for: CHOL  No results found for: TRIG  No results found for: HDL  No results found for: LDLCALC  No results found for: HGBA1C  No results found for: SODIUM, K, CL, CO2, ANIONGAP, AGAP, BUN, CREATININE, GLUC, GLUF, CALCIUM, AST, ALT, ALKPHOS, PROT, TP, BILITOT, TBILI, EGFR    Orders Placed This Encounter Procedures    Ambulatory referral to Obstetrics / Gynecology       ALLERGIES:  No Known Allergies    Current Medications     Current Outpatient Medications   Medication Sig Dispense Refill    drospirenone-ethinyl estradiol (MARIUSZ) 3-0 02 MG per tablet Take 1 tablet by mouth daily 84 tablet 11    rizatriptan (Maxalt-MLT) 10 mg disintegrating tablet Take 1 tablet (10 mg total) by mouth as needed for migraine Take at the onset of migraine; if symptoms persists may take another dose at least 2 hours after first dose  Take no more than 2 doses in a day  18 tablet 1    sertraline (ZOLOFT) 100 mg tablet Take 1 tablet (100 mg total) by mouth daily 90 tablet 1     No current facility-administered medications for this visit         Medications Discontinued During This Encounter   Medication Reason    norethindrone (Ortho Micronor) 0 35 MG tablet Therapy completed       Health Maintenance     Health Maintenance   Topic Date Due    Hepatitis C Screening  Never done    HIV Screening  Never done    Influenza Vaccine (1) 09/01/2022    DTaP,Tdap,and Td Vaccines (7 - Td or Tdap) 02/27/2023    Chlamydia Screening  07/26/2023    Depression Screening  08/09/2023    BMI: Adult  08/09/2023    Annual Physical  08/09/2023    BMI: Followup Plan  08/11/2023    HIB Vaccine  Completed    Hepatitis B Vaccine  Completed    IPV Vaccine  Completed    Hepatitis A Vaccine  Completed    Meningococcal ACWY Vaccine  Completed    HPV Vaccine  Completed    COVID-19 Vaccine  Completed    Pneumococcal Vaccine: Pediatrics (0 to 5 Years) and At-Risk Patients (6 to 59 Years)  Aged [employer] History   Administered Date(s) Administered    COVID-19 MODERNA VACC 0 5 ML IM 04/25/2021, 05/25/2021, 01/24/2022    DTaP 5 2001, 2001, 02/18/2002, 05/22/2003, 08/26/2006    HPV Quadrivalent 02/27/2013, 05/24/2013, 11/12/2014    Hep A, ped/adol, 2 dose 05/23/2018    Hep B, adult 04/15/2002, 06/17/2002, 02/20/2003    Hepatitis A 02/27/2013    HiB 2001, 01/07/2002, 06/17/2002, 02/20/2003    INFLUENZA 10/19/2009, 02/27/2013, 11/17/2016, 09/23/2021    IPV 2001, 01/07/2002, 05/22/2003, 03/03/2012    Influenza Quadrivalent Preservative Free 3 years and older IM 11/12/2014, 11/17/2016    Influenza, seasonal, injectable 10/19/2009    MMR 08/26/2006, 11/18/2012    Meningococcal B, Recombinant (TRUMENBA) 05/23/2018, 09/20/2019    Meningococcal MCV4P 02/27/2013, 05/23/2018    Pneumococcal Conjugate PCV 7 2001, 02/18/2002    Tdap 02/27/2013    Tuberculin Skin Test-PPD Intradermal 09/18/2018, 10/02/2018    Varicella 03/03/2012, 11/18/2012       Esequiel Platt MD

## 2022-08-11 PROBLEM — G43.829 MENSTRUAL MIGRAINE WITHOUT STATUS MIGRAINOSUS, NOT INTRACTABLE: Status: ACTIVE | Noted: 2022-08-11

## 2022-08-11 PROBLEM — N94.6 DYSMENORRHEA: Status: ACTIVE | Noted: 2022-08-11

## 2022-08-11 NOTE — ASSESSMENT & PLAN NOTE
· No aura  No daily symptoms  Exam is normal   · Premenstrual migraine, lasting 1 to 2 days, partially relieved by Excedrin  · I recommend trial of Maxalt along with ibuprofen p r n  For headache   No indication for daily preventative therapy  · Patient is aware that lower dose of OCP might improve frequency and severity of headaches but firmly declines to switch her birth control pill formulation at present time    She is very reluctant to start Micronor that was suggested by [facility] and is proceeding with evaluation by a different gynecologist    Pending follow-up with [facility] in November

## 2022-08-11 NOTE — ASSESSMENT & PLAN NOTE
History of longstanding severe dysmenorrhea  Patient states that she has tried numerous OCPs and has noticed significant improvement with start of yes  She has developed symptoms of menstrual migraines within past few months  Patient states that she firmly prefers to continue on yes therapy since headaches are not severe and menstrual cramping was very burden some    She is scheduled for follow-up with gyn and neurology

## 2022-08-11 NOTE — ASSESSMENT & PLAN NOTE
Symptoms have significantly improved with start of Zoloft, current dose is 100 mg daily  Panic attacks have disappeared    Continue same medication

## 2022-11-11 ENCOUNTER — TELEPHONE (OUTPATIENT)
Dept: FAMILY MEDICINE CLINIC | Facility: CLINIC | Age: 22
End: 2022-11-11

## 2022-11-11 NOTE — TELEPHONE ENCOUNTER
Patient is calling regarding cancelling an appointment      Date/Time: 11/11/2022 09:30     Patient was rescheduled: YES [] NO [x]    Patient requesting call back to reschedule: YES [] NO [x]

## 2022-11-16 DIAGNOSIS — F41.9 ANXIETY: ICD-10-CM

## 2022-11-16 RX ORDER — SERTRALINE HYDROCHLORIDE 100 MG/1
150 TABLET, FILM COATED ORAL DAILY
Qty: 135 TABLET | Refills: 1 | Status: SHIPPED | OUTPATIENT
Start: 2022-11-16

## 2022-11-17 ENCOUNTER — CONSULT (OUTPATIENT)
Dept: NEUROLOGY | Facility: CLINIC | Age: 22
End: 2022-11-17

## 2022-11-17 VITALS
SYSTOLIC BLOOD PRESSURE: 106 MMHG | WEIGHT: 182 LBS | HEART RATE: 91 BPM | BODY MASS INDEX: 32.25 KG/M2 | DIASTOLIC BLOOD PRESSURE: 78 MMHG | HEIGHT: 63 IN

## 2022-11-17 DIAGNOSIS — Z01.812 PRE-PROCEDURE LAB EXAM: ICD-10-CM

## 2022-11-17 DIAGNOSIS — G43.829 MENSTRUAL MIGRAINE WITHOUT STATUS MIGRAINOSUS, NOT INTRACTABLE: Primary | ICD-10-CM

## 2022-11-17 DIAGNOSIS — R51.9 NEW ONSET HEADACHE: ICD-10-CM

## 2022-11-17 RX ORDER — LORAZEPAM 0.5 MG/1
0.5 TABLET ORAL EVERY 8 HOURS PRN
Qty: 3 TABLET | Refills: 0 | Status: SHIPPED | OUTPATIENT
Start: 2022-11-17

## 2022-11-17 NOTE — PROGRESS NOTES
Assessment/Plan:          Migraine  Jennifer Conti is a 25 y o  female w/ PMH anxiety, panic attacks who presents for evaluation of new-onset headache that best fits menstrual migraine type but has some features (worsening w/ coughing/bending over, worst severity on waking) concerning for anatomic/vascular etiology and warrants further workup  • Description: premenstrual/menstrual migraine lasting 1-2 days  • Triggers: menses, blue light from lap-top, worsened with bending over, coughing, straining  • Associated sx: dysmenorrhea (severe) - numerous OCPs tried and only with improvement on current regimen (t f  reluctant to change)  • Meets criteria for migraine    Plan:  • Abortive: rizatriptan 10 mg prescribed by PCP - patient endorses good efficacy and she can continue this as prescribed  • Preventive: patient working w/ [employer] - takes Demetrio Due such that she only gets menses once every 3 months to reduce frequency of headaches  Can consider topiramate if these should become more frequent and severe (would avoid BB in setting of low BP and would avoid TCAs in setting of her current sertraline use) but at this point additional preventive not indicated  • Cycle breaker: none indicated at this time   • Imaging: MRI w/wo, prescribed ativan for anxiety prior to imaging  • Labs: BMP - check electrolytes and renal function  • Headache hygiene discussed including maintaining good sleep habits, drinking sufficient water, getting regular meals and exercise  • Migraine/headache triggers outlined including foods, sleep deprivation, dehydration, YONG  • OTC and non-pharmacologic management discussed including Mg and B2, neck exercises and stretching, headache diary apps, etc     Anxiety  Patient w/ history of anxiety and panic attacks which have resolved with addition of 100 mg zoloft per PCP  Can continue to f/w PCP  Will avoid TCAs and other serotonergic medications should she eventually need preventive medication   Ativan prescribed for prior to MRI     Diagnoses and all orders for this visit:    Menstrual migraine without status migrainosus, not intractable  -     Ambulatory Referral to Neurology          Subjective:             Patient ID: Andrez Sinclair is a 25 y o  female  HPI  Andrez Sinclair is a 25 y o  female w/ PMH anxiety and menstrual migraine who presents today for evaluation of migraines  Patient notes that just starting this year she began having cycling headaches that predominantly occur on the first-third days of her period  Typically bifrontal but can migrate and can reach severity of 8-9/10  Associated w/ N/V, photo/phonophobia  Worsened with activity  Does have some red flag features including worst severity on waking at times, and worsening with cough and bending over  Patient used to have severe dysmenorrhea as well which has abated since being on Robyn and now with her OB-GYN she has worked on alternative way of taking this (so she menstruates only once every few months)  Given the lower frequency of this, no a candidate for preventive at this time but did discuss possible options (most likely to choose topiramate given that she hydrates well, has no nephrolithiasis history and she has low BP and uses sertraline)  PCP prescribed rizatriptan which has been effective for her acute headache treatment  Agree with continuing this for now       Headache characteristics:  Age of Onset: 23-20 y/o  Location: migrates, predominantly bifrontal  Quality: throbbing  Severity: 8/10  Freq (HA days per month/Severe HA days per month): once a month for a couple days (occasionally also not on period)  Duration: varies  Aura: no  Associated (photo/phono/osmo): yes/yes/yes  Nausea/Vomiting:  Yes/yes   Neurologic Features (numb arm/weak leg):  Dizziness once otherwise no  Triggers: menstruation, blue light from laptop  Relieving factors: rizatriptan (started 3 mo ago, working well)  Missed work days in the last 6 months: One day last semester  Bed rest days in the at least 6 months:  No  OTC Rx (OTC med use/treatment days per month): Ibuprofen w/ rizatriptan and small can of soda  Current Prescription Rx:    Abortive:  Rizatriptan (effective)   Preventative: OB-GYN changed the way she takes the OCP - every 3 months will take the placebo and have menstruation, otherwise just continues w/o period  Past Prescription Hx: no  OCPs:  mariusz  Pregnancy:  no  FH SAH, aneurysm or migraines:  Migraines   Sleep:  6-8 hrs nightly, no frequent wakenings   -- signs of sleep apnea? Does snore, sometimes does not wake up feeling refreshed, grandmother had sleep apnea  Mood:  Stable on the zoloft 150 mg   Caffeine:  Not much, cup of coffee in the morning   Water:  32 oz Yeti filled multiple times a day  Exercise:  yes  Regular meals yes              The following portions of the patient's history were reviewed and updated as appropriate: She  has a past medical history of Migraine aura without headache  She   Patient Active Problem List    Diagnosis Date Noted   • Dysmenorrhea 08/11/2022   • Menstrual migraine without status migrainosus, not intractable 08/11/2022   • Panic attack 07/16/2020   • Anxiety 07/16/2020   • Fatigue 07/16/2020     She  has a past surgical history that includes No past surgeries  Her family history includes Allergies in her family; Anemia in her mother; Asthma in her mother; Cancer in her family; Diabetes in her family; Heart disease in her family; Hypertension in her family; No Known Problems in her father; Stroke in her family  She  reports that she has never smoked  She has never used smokeless tobacco  She reports that she does not currently use alcohol  She reports that she does not use drugs    Current Outpatient Medications   Medication Sig Dispense Refill   • drospirenone-ethinyl estradiol (MARIUZS) 3-0 02 MG per tablet Take 1 tablet by mouth daily 84 tablet 11   • rizatriptan (Maxalt-MLT) 10 mg disintegrating tablet Take 1 tablet (10 mg total) by mouth as needed for migraine Take at the onset of migraine; if symptoms persists may take another dose at least 2 hours after first dose  Take no more than 2 doses in a day  18 tablet 1   • sertraline (ZOLOFT) 100 mg tablet Take 1 5 tablets (150 mg total) by mouth daily 135 tablet 1     No current facility-administered medications for this visit  Current Outpatient Medications on File Prior to Visit   Medication Sig   • drospirenone-ethinyl estradiol (MARIUSZ) 3-0 02 MG per tablet Take 1 tablet by mouth daily   • rizatriptan (Maxalt-MLT) 10 mg disintegrating tablet Take 1 tablet (10 mg total) by mouth as needed for migraine Take at the onset of migraine; if symptoms persists may take another dose at least 2 hours after first dose  Take no more than 2 doses in a day  • sertraline (ZOLOFT) 100 mg tablet Take 1 5 tablets (150 mg total) by mouth daily     No current facility-administered medications on file prior to visit  She has No Known Allergies         Objective:           /78 (BP Location: Left arm, Patient Position: Sitting, Cuff Size: Standard)   Pulse 91   Ht 5' 3" (1 6 m)   Wt 82 6 kg (182 lb)   BMI 32 24 kg/m²     Physical Exam   Vitals reviewed  Constitutional:    Not in acute distress  Normal appearance  Not ill-appearing, toxic-appearing or diaphoretic  Elevated BMI  HENT:   Normocephalic and atraumatic  External ear normal b/l  Nose normal  No congestion or rhinorrhea  Mucous membranes are moist   Oropharynx is clear  No oropharyngeal exudate or posterior oropharyngeal erythema  Eyes:    No scleral icterus  No discharge b/l  Conjunctivae normal  No papilledema  Pulmonary:  Pulmonary effort is normal  No respiratory distress  GI: abdomen not distended  Musculoskeletal: no gross deformities  Skin:   Skin is not pale  Psychiatric:       Mood normal  Affect congruent    Neurological Exam  Mental Status Alert and oriented to person, place, and time  Attention is normal  Speech is fluent w/ naming and repetition intact  Delayed recall 3/3 @ 4 min  Cranial Nerves CN II Visual fields full to confrontation  CN III, IV, VI PERRL, brisk reactivity and consensual response intact b/l  EOMI w/o CN VI or III palsy  No clear nystagmus  CN V, VII Facial sensation and expression full, symmetric  CN VIII Hearing grossly symmetric  CN IX, X Palate symmetric  CN XI trapezius strength symmetric  CN XII no dysarthria, symmetric lingual protrusion   Motor Exam Muscle bulk and tone grossly normal  Pronator drift absent b/l  Strength: R/L     Deltoid:        Biceps:       Triceps:     Wrist flexion:     Wrist extension:   Iliopsoas:    Quads:    Hamstrin/5   AT:     Gastroc:      Sensory Exam Light touch, vibration, temperature intact and symmetric   Gait, Coordination, and Reflexes FTN normal  No tremor observed  Reflexes symmetrically brisk: R/L      Brachioradialis: 2+/2+   Biceps: 2+/2+     Pateller: 2+/2+   Plantar: downgoing/downgoing  Gait: casual gait with average stance, stride length, arm swing      Review of Systems  Constitutional: Negative for chills, fatigue, fever and unexpected weight change  Eyes: Negative for visual disturbance  Respiratory: Negative for shortness of breath and wheezing  Cardiovascular: Negative for chest pain  Gastrointestinal: Negative for abdominal distention, abdominal pain, blood in stool, constipation, diarrhea, nausea and vomiting  Endocrine: Negative for polyuria  Genitourinary: Negative for dysuria and hematuria  Neurological: Negative for dizziness, tremors, weakness, numbness but positive for headaches  Psychiatric/Behavioral: Negative for sleep disturbance                       Gabriela Guido MD  [facility] Neurology Resident, PGY III

## 2022-11-17 NOTE — PATIENT INSTRUCTIONS
Headache management instructions  - Maintain regular sleep schedule  Adults need at least 7-8 hours of uninterrupted a night  - Limit over the counter medications such as Tylenol, Ibuprofen, Aleve, Excedrin  (No more than 2-3 times a week or max 10 times a month)  - Maintain headache diary  Free LINDA for a smart phone, which can be used is "Migraine rik" or "migraine diary"  - Limit caffeine to 1-2 cups 8 to 16 oz a day or less and try to always take the same amount of caffeine daily if possible as deviation from this pattern may trigger a headache  - Avoid dietary triggers (aged cheese, peanuts, MSG, aspartame and nitrates)  - Try to have regular frequent meals to avoid triggering a headache  - Please drink at least 64 ounces of water a day to help maintain adequate hydration  - If an aura lasts longer than its typical time or if you have any new neurological symptoms (numbness/ tingling/ weakness), please see physician for evaluation  Preventive therapy for headaches: Over-the-counter supplements to decrease intensity and frequency of migraines  - Magnesium Oxide 400mg a day  If any diarrhea or upset stomach, decrease dose  as tolerated  - Vitamin B2/Riboflavin 200 mg twice a day  May cause the urine to turn yellow/orange which is normal for B 2 to do and is not a sign that you are dehydrated  Recommended to be taken with food so it is better absorbed  Also found in: Meats, Spinach, Nuts, Fish, Cheese, Legumes, Eggs, Whole grains, Milk, Yogurt  - Avoid cigarette smoking and carefully manage stroke risk factors such as hypertension, hyperlipidemia, diabetes    Basic neck exercises for daily use:  Neck pathology and poor posture, with straightening of the normal cervical lordosis, can cause headaches  Tightening of the neck muscles can irritate the nerves in the occipital region of the head and cause or worsen head pain   Thus neck strengthening and relaxation exercises, can help improve this particular pain  It is importance to have good posture for improving shoulder, neck, and head pain  Here are some exercises which should take 5 minutes:     1  Standing, drop your head to one side while continuing to look ahead  Hold for 10 seconds then swap sides  Repeat twice more each side  To increase the stretch, drop the opposite shoulder  2  Standing again, lower your chin to your chest, hold for 10 and then look up to the ceiling and hold for 10  Repeat twice more  3  Next, standing straight again, look over your right shoulder and hold firm for 10 seconds, then over your left shoulder for 10  Repeat this 3 times  4  Finally, while sitting upright, bring your head forward and hold for 10, then all the way back and hold for 10  If this simple exercise does not help improve the posture, we will consider formal physical therapy in the future  Importance of Healthy Sleep: Behavioral sleep changes can promote restful, regular sleep and reduce headache  Simple changes like establishing consistent sleep and wake-up times, as well as getting between 7 and 8 hours of sleep a day, can make a world of difference  Experts also recommend avoiding substances that impair sleep, like caffeine, nicotine and alcohol, and also suggest winding down before bed to prevent sleep problems  To read more go to https://americanmigrainefoundation  org/resource-library/sleep/  - If you are experiencing insomnia or symptoms suggestive of sleep apnea, we would recommend following up with Sleep Medicine for full evaluation  Sleep apnea, which many patients do not even know that they have, can impair cognitive function and memory, increase risk of stroke, increase blood pressure, and make it nearly impossible to lose weight, among many other negative effects    Exercise:  Regular exercise can reduce the frequency and intensity of headaches and migraines   When one exercises, the body releases endorphins, which are the body’s natural painkillers  Exercise reduces stress and helps individuals to sleep at night  Exercising at least 30 to 40 minutes 3 times a week is sufficient for most patients  When exercising, follow this plan to prevent headaches:  - First, stay hydrated before, during, and after exercise  - Second part of the exercise plan is to eat sufficient food about an hour and a half before you exercise  Exercise causes one’s blood sugar level to decrease, and it is important to have a source of energy    - Final part of the exercise plan is to warm-up  Do not jump into sudden, vigorous exercise if that triggers a headache or migraine  To read more go to https://americanmigrainefoundation  org/resource-library/effects-of-exercise-on-headaches-and-migraines/    Other Vitamins and Additional Management options for patients with headaches:  Coenzyme Q10 Coenzyme Q10 is a fat soluble vitamin  Small amount of Vitamin E containing forms help its absorption  You can search internet for chewable and liquid forms   Found in Fish, Whole grains, Beef, Spinach, Soy, Peanuts, Mackerel, Sardines, Vegetable oil  Vitamin D can be increased with exposure to sunlight (15-20 minutes daily), Canned tuna, Fortified Milk, Fresh wild salmon, Fortified Orange Juice, Pickled Herring, Fortified Yogurts, Poached Catfish, Fortified cheese, Canned Sardines, Fortified breakfast cereals, Canned Allande, Cod liver oil (1tbsp)  Mindfulness/Meditation for Treating Migraine Stress can be a major trigger for headache or migraine  This is where mindfulness and meditation can come into play, as they have been known to help reduce migraine severity, duration and acute pain medication use  It may also help to relieve stress and anxiety while improving feelings of well being

## 2022-12-11 ENCOUNTER — HOSPITAL ENCOUNTER (OUTPATIENT)
Dept: MRI IMAGING | Facility: HOSPITAL | Age: 22
Discharge: HOME | End: 2022-12-11

## 2022-12-11 DIAGNOSIS — G43.829 MENSTRUAL MIGRAINE WITHOUT STATUS MIGRAINOSUS, NOT INTRACTABLE: ICD-10-CM

## 2022-12-11 DIAGNOSIS — R51.9 NEW ONSET HEADACHE: ICD-10-CM

## 2022-12-11 RX ADMIN — GADOBUTROL 8 ML: 604.72 INJECTION INTRAVENOUS at 08:01

## 2023-01-11 DIAGNOSIS — G43.829 MENSTRUAL MIGRAINE WITHOUT STATUS MIGRAINOSUS, NOT INTRACTABLE: ICD-10-CM

## 2023-01-12 RX ORDER — RIZATRIPTAN BENZOATE 10 MG/1
10 TABLET, ORALLY DISINTEGRATING ORAL AS NEEDED
Qty: 18 TABLET | Refills: 0 | Status: SHIPPED | OUTPATIENT
Start: 2023-01-12

## 2023-01-16 ENCOUNTER — TELEPHONE (OUTPATIENT)
Dept: NEUROLOGY | Facility: CLINIC | Age: 23
End: 2023-01-16

## 2023-01-17 NOTE — TELEPHONE ENCOUNTER
SERAM requesting pt to call back and leave detailed message with her question so we can route question to Dr Cherelle Kirkpatrick  Awaiting call back

## 2023-01-17 NOTE — TELEPHONE ENCOUNTER
Pt left VM re: starting preventative migraine medicine  Transcribed VM:   Hi, this is [employer]  Birthday is [birth year]  I was just calling about questions at Dr Rula Martinez  She says I can get on the preventative headache medication  And I was wondering if I could get on the preventative since the migraines have gotten worse  If you could give me a call back at [phone]  That would be great  Thank you

## 2023-01-18 ENCOUNTER — TELEPHONE (OUTPATIENT)
Dept: OTHER | Facility: HOSPITAL | Age: 23
End: 2023-01-18

## 2023-01-18 DIAGNOSIS — G43.909 MIGRAINE: Primary | ICD-10-CM

## 2023-01-18 RX ORDER — TOPIRAMATE 25 MG/1
50 TABLET ORAL 2 TIMES DAILY
Qty: 120 TABLET | Refills: 2 | Status: SHIPPED | OUTPATIENT
Start: 2023-01-18

## 2023-01-18 NOTE — TELEPHONE ENCOUNTER
Called patient to discuss increased headaches - patient notes that she had one prolonged menstrual headache that lasted over a week  No new focal symptoms and no other new characteristics  Has had recent MRI w/o clear explanatory abnormality  Given this worsening would be reasonable to pursue preventive medication at this time    Discussed topiramate and possible side effects  Patient denies history or kidney stones and notes her mood has been stable  We discussed the very slow up-titration and what to watch out for in terms of other side effects  We discussed possible birth defects and the need to remain on her birth control and that we can wean her off this medication should she choose to want to become pregnant in the future  Patient expressed understanding and agreement and would like to proceed

## 2023-01-20 NOTE — TELEPHONE ENCOUNTER
Called pt regarding below,left message to give us a call back with any questions or concerns about Topiramate and make sure she stay very well hydrated  Call back # provided

## 2023-01-20 NOTE — TELEPHONE ENCOUNTER
January 18, 2023  Mynor Tabares MD  to Me    NK    3:29 PM  Hi!! Reached out to patient and discussed starting low dose topiramate  Discussed risks and side effects and she expressed understanding but could someone reach out to her later this week to ensure that she is taking as instructed and staying very well hydrated? Below is the sig as prescribed for reference:     "Take 2 tablets (50 mg total) by mouth 2 (two) times a day Start with 1 tablet nightly at bedtime for 2 weeks  You can add one tablet to your regimen every 2 weeks (in the morning or nighttime) to a maximum of 2 tablets twice a day  Stop addition of doses if at any time you find adequate improvement in headaches or intolerability of side effects  Please stay very well hydrated on this medication"     Let me know if she has any questions or concerns! Thanks so much! !    Lam Mendoza

## 2023-01-23 NOTE — TELEPHONE ENCOUNTER
Pt left message  I was recently started on Topamax , I was wondering if can be changed to a better abortive or preventive medication for my migraine,since I don't like the side effects that I read about  Please give me a call back   [phone]      Called pt back ,stated that she never started Topamax because she read that it can affect her birth control medication and she doesn't want that to happen      Please advise

## 2023-02-09 ENCOUNTER — TELEPHONE (OUTPATIENT)
Dept: OTHER | Facility: HOSPITAL | Age: 23
End: 2023-02-09

## 2023-02-09 DIAGNOSIS — G43.829 MENSTRUAL MIGRAINE WITHOUT STATUS MIGRAINOSUS, NOT INTRACTABLE: ICD-10-CM

## 2023-02-09 NOTE — TELEPHONE ENCOUNTER
Called patient to discuss headaches and starting a new medication given avoiding topiramate in setting of interaction with OCPs  Patient did initiate magnesium and using her blue light glasses in the interim and feels that her headaches have improved significantly and that she does not want to try any new prescriptions at this time  We agreed that this is a reasonable, course to pursue and she additionally may try to find riboflavin or add a Bcomplex to her regimen if needed  In the interim, can assume responsibility for rizatriptan and will provide refill when appropriate  All questions and concerns addressed  Patient to call for any additional concerns or new symptoms

## 2023-02-28 RX ORDER — RIZATRIPTAN BENZOATE 10 MG/1
10 TABLET, ORALLY DISINTEGRATING ORAL AS NEEDED
Qty: 18 TABLET | Refills: 0 | Status: SHIPPED | OUTPATIENT
Start: 2023-02-28

## 2023-06-17 DIAGNOSIS — F41.9 ANXIETY: ICD-10-CM

## 2023-06-17 RX ORDER — SERTRALINE HYDROCHLORIDE 100 MG/1
TABLET, FILM COATED ORAL
Qty: 135 TABLET | Refills: 1 | Status: SHIPPED | OUTPATIENT
Start: 2023-06-17

## 2023-06-21 NOTE — PROGRESS NOTES
Name: Rylee Mazariegos   Age & Sex: 25 y o  female   MRN: [id number]     This patient was discussed and staffed with Dr Jordyn Sparrow    Assessment/Plan:  Migraine  Rylee Mazariegos is a 25 y o  female w/ PMH anxiety, panic attacks who presents for evaluation of new-onset headache that best fits menstrual migraine type but initially had some features (worsening w/ coughing/bending over, worst severity on waking) concerning for anatomic/vascular etiology  MRI showed no significant concerning findings and patient's headache's improved significantly with magnesium and blue light glasses  At this point she is doing well on Maxalt and only needing to take about 2-3 times per month when she has her period  Given her stability and reassuring workup, reasonable to f/u as needed with neurology office  PCP can prescribe rizatriptan moving forward  Refilled today  • Description: premenstrual/menstrual migraine lasting 1-2 days  • Triggers: menses, blue light from lap-top, worsened with bending over, coughing, straining  • Associated sx: dysmenorrhea (severe) - numerous OCPs tried and only with improvement on current regimen (t f  reluctant to change)  • Meets criteria for migraine  • Preventive: patient working w/ OBGYN - takes Robyn such that she only gets menses once every 3 months to reduce frequency of headaches  Can consider topiramate if these should become more frequent and severe (would avoid BB in setting of low BP and would avoid TCAs in setting of her current sertraline use) but patient had significant reservations about the topiramate when describing the side effects including renal stones and interference with contraceptives  • MRI brain w/wo (12/11/22):  No diffusion restriction indicative of acute ischemia, no chronic changes indicative of microvascular change, no significant abnormal enhancement, but patient does have adenoidal hypertrophy narrowing the nasopharyngeal airway     Plan:  • Abortive: rizatriptan "10 mg (initially prescribed by PCP, effective per patient)  • Preventive: discussions as described above - none indicated at the moment  • Cycle breaker: none indicated at this time   • Headache hygiene previously discussed including maintaining good sleep habits, drinking sufficient water, getting regular meals and exercise; migraine/headache triggers previously outlined including foods, sleep deprivation, dehydration, YONG  • Previously reviewed OTC and non-pharmacologic management discussed including Mg and B2, neck exercises and stretching, headache diary apps, etc   • F/u as needed     Anxiety  Patient w/ history of anxiety and panic attacks which have resolved with addition of 100 mg zoloft per PCP  Can continue to f/w PCP  Will avoid TCAs and other serotonergic medications should she eventually need preventive medication          Diagnoses and all orders for this visit:    Menstrual migraine without status migrainosus, not intractable  -     rizatriptan (Maxalt-MLT) 10 mg disintegrating tablet; Take 1 tablet (10 mg total) by mouth as needed for migraine Take at the onset of migraine; if symptoms persists may take another dose at least 2 hours after first dose  Take no more than 2 doses in a day  Subjective:         Tayla Amaya is a 25 y o  female w/ PMH dysmenorrhea and headache who presents today for headache f/u  Since last visit had a couple of phone calls to discuss headaches and patient had significant improvement with magnesium and blue light glasses and never needed to start her preventive medication  At this point, no indication for further workup or addition of new medications  She has only needed the rizatriptan 2-3 times per month during her period and is overall satisfied with how her headaches are currently  She is amenable to getting refills of her rizatriptan through her PCP and will f/u w/ neuro prn       HPI retained from prior for continuity: Tayla Amaya is a 25 y o  " female w/ PMH anxiety and menstrual migraine who presents today for evaluation of migraines  Patient notes that just starting this year she began having cycling headaches that predominantly occur on the first-third days of her period  Typically bifrontal but can migrate and can reach severity of 8-9/10  Associated w/ N/V, photo/phonophobia  Worsened with activity  Does have some red flag features including worst severity on waking at times, and worsening with cough and bending over  Patient used to have severe dysmenorrhea as well which has abated since being on Robyn and now with her OB-GYN she has worked on alternative way of taking this (so she menstruates only once every few months)  Given the lower frequency of this, no a candidate for preventive at this time but did discuss possible options (most likely to choose topiramate given that she hydrates well, has no nephrolithiasis history and she has low BP and uses sertraline)  PCP prescribed rizatriptan which has been effective for her acute headache treatment  Agree with continuing this for now       Headache characteristics:  Age of Onset: 23-20 y/o  Location: migrates, predominantly bifrontal  Quality: throbbing  Severity: 8/10  Freq (HA days per month/Severe HA days per month): once a month for a couple days (occasionally also not on period)  Duration: varies  Aura: no  Associated (photo/phono/osmo): yes/yes/yes  Nausea/Vomiting:  Yes/yes   Neurologic Features (numb arm/weak leg):  Dizziness once otherwise no  Triggers: menstruation, blue light from laptop  Relieving factors: rizatriptan (started 3 mo ago, working well)  Missed work days in the last 6 months:  One day last semester  Bed rest days in the at least 6 months:  No  OTC Rx (OTC med use/treatment days per month):   Ibuprofen w/ rizatriptan and small can of soda  Current Prescription Rx:               Abortive:  Rizatriptan (effective)              Preventative: OB-GYN changed the way she takes the [facility] "- every 3 months will take the placebo and have menstruation, otherwise just continues w/o period  Past Prescription Hx: no  OCPs:  mariusz  Pregnancy:  no  FH SAH, aneurysm or migraines:  Migraines   Sleep:  6-8 hrs nightly, no frequent wakenings   -- signs of sleep apnea? Does snore, sometimes does not wake up feeling refreshed, grandmother had sleep apnea  Mood:  Stable on the zoloft 150 mg   Caffeine:  Not much, cup of coffee in the morning   Water:  32 oz Yeti filled multiple times a day  Exercise:  yes  Regular meals yes\"        The following portions of the patient's history were reviewed and updated as appropriate: She  has a past medical history of Migraine aura without headache  She   Patient Active Problem List    Diagnosis Date Noted   • Dysmenorrhea 08/11/2022   • Menstrual migraine without status migrainosus, not intractable 08/11/2022   • Panic attack 07/16/2020   • Anxiety 07/16/2020   • Fatigue 07/16/2020     She  has a past surgical history that includes No past surgeries  Her family history includes Allergies in her family; Anemia in her mother; Asthma in her mother; Cancer in her family; Diabetes in her family; Heart disease in her family; Hypertension in her family; No Known Problems in her father; Stroke in her family  She  reports that she has never smoked  She has never used smokeless tobacco  She reports that she does not currently use alcohol  She reports that she does not use drugs  Current Outpatient Medications   Medication Sig Dispense Refill   • drospirenone-ethinyl estradiol (MARIUSZ) 3-0 02 MG per tablet Take 1 tablet by mouth daily 84 tablet 11   • rizatriptan (Maxalt-MLT) 10 mg disintegrating tablet Take 1 tablet (10 mg total) by mouth as needed for migraine Take at the onset of migraine; if symptoms persists may take another dose at least 2 hours after first dose  Take no more than 2 doses in a day   18 tablet 0   • sertraline (ZOLOFT) 100 mg tablet TAKE 1 5 TABLET BY MOUTH EVERY DAY " "135 tablet 1     No current facility-administered medications for this visit  Current Outpatient Medications on File Prior to Visit   Medication Sig   • drospirenone-ethinyl estradiol (MARIUSZ) 3-0 02 MG per tablet Take 1 tablet by mouth daily   • sertraline (ZOLOFT) 100 mg tablet TAKE 1 5 TABLET BY MOUTH EVERY DAY   • [DISCONTINUED] rizatriptan (Maxalt-MLT) 10 mg disintegrating tablet Take 1 tablet (10 mg total) by mouth as needed for migraine Take at the onset of migraine; if symptoms persists may take another dose at least 2 hours after first dose  Take no more than 2 doses in a day  • [DISCONTINUED] topiramate (Topamax) 25 mg tablet Take 2 tablets (50 mg total) by mouth 2 (two) times a day Start with 1 tablet nightly at bedtime for 2 weeks  You can add one tablet to your regimen every 2 weeks (in the morning or nighttime) to a maximum of 2 tablets twice a day  Stop addition of doses if at any time you find adequate improvement in headaches or intolerability of side effects  Please stay very well hydrated on this medication     No current facility-administered medications on file prior to visit  She has No Known Allergies       Objective:        /72 (BP Location: Right arm, Patient Position: Sitting, Cuff Size: Standard)   Pulse 81   Ht 5' 3\" (1 6 m)   Wt 81 6 kg (180 lb)   BMI 31 89 kg/m²     Physical Exam   Vitals reviewed  Constitutional:    Not in acute distress  Normal appearance  Not ill-appearing, toxic-appearing or diaphoretic  HENT:   Normocephalic and atraumatic  External ear normal b/l  Nose normal  No congestion or rhinorrhea  Mucous membranes are moist   Oropharynx is clear  Eyes:    No scleral icterus  No discharge b/l  Conjunctivae normal    Pulmonary:  Pulmonary effort is normal  No respiratory distress  GI: abdomen not distended  Musculoskeletal: no gross deformities  Skin:   Skin is not pale     Psychiatric:       Mood normal  Affect congruent    Neurological " Exam  Mental Status Alert and oriented to person, place, and time  Attention is normal  Speech is fluent w/ naming and repetition intact  Cranial Nerves  Visual fields full to confrontation  PERRL, brisk reactivity and consensual response intact b/l  EOMI w/o CN VI or III palsy  No clear nystagmus  Facial sensation and expression full, symmetric  Hearing grossly symmetric  Palate symmetric  Trapezius strength symmetric  No dysarthria, tongue symmetric without atrophy or fasciculations   Motor Exam Muscle bulk and tone grossly normal  Pronator drift absent b/l  Strength: R/L     Deltoid:        Biceps:       Triceps:     Wrist flexion:     Wrist extension:   Iliopsoas:    Quads:    Hamstrin/5   AT:     Gastroc:      Sensory Exam Light touch, vibration, temperature intact and symmetric   Gait, Coordination, and Reflexes FTN normal  No tremor observed  Gait: casual gait with average stance, stride length, arm swing      Review of Systems  Constitutional: Negative for chills, fatigue, fever and unexpected weight change  Eyes: Negative for visual disturbance  Respiratory: Negative for shortness of breath and wheezing  Cardiovascular: Negative for chest pain  Gastrointestinal: Negative for abdominal distention, abdominal pain, blood in stool, constipation, diarrhea, nausea and vomiting  Endocrine: Negative for polyuria  Genitourinary: Negative for dysuria and hematuria  Neurological: Negative for dizziness, tremors, weakness, numbness and positive for headaches  Psychiatric/Behavioral: Negative for sleep disturbance         ~~~~~~~~~~  Pili Nunes MD  [facility] Neurology Resident, PGY 3

## 2023-06-22 ENCOUNTER — OFFICE VISIT (OUTPATIENT)
Dept: NEUROLOGY | Facility: CLINIC | Age: 23
End: 2023-06-22
Payer: MEDICARE

## 2023-06-22 VITALS
HEART RATE: 81 BPM | HEIGHT: 63 IN | BODY MASS INDEX: 31.89 KG/M2 | DIASTOLIC BLOOD PRESSURE: 72 MMHG | SYSTOLIC BLOOD PRESSURE: 108 MMHG | WEIGHT: 180 LBS

## 2023-06-22 DIAGNOSIS — G43.829 MENSTRUAL MIGRAINE WITHOUT STATUS MIGRAINOSUS, NOT INTRACTABLE: ICD-10-CM

## 2023-06-22 PROCEDURE — 99214 OFFICE O/P EST MOD 30 MIN: CPT | Performed by: PSYCHIATRY & NEUROLOGY

## 2023-06-22 RX ORDER — RIZATRIPTAN BENZOATE 10 MG/1
10 TABLET, ORALLY DISINTEGRATING ORAL AS NEEDED
Qty: 18 TABLET | Refills: 0 | Status: SHIPPED | OUTPATIENT
Start: 2023-06-22

## 2023-12-18 DIAGNOSIS — F41.9 ANXIETY: ICD-10-CM

## 2023-12-19 RX ORDER — SERTRALINE HYDROCHLORIDE 100 MG/1
150 TABLET, FILM COATED ORAL DAILY
Qty: 135 TABLET | Refills: 0 | Status: SHIPPED | OUTPATIENT
Start: 2023-12-19

## 2024-01-25 DIAGNOSIS — F41.9 ANXIETY: ICD-10-CM

## 2024-01-26 RX ORDER — SERTRALINE HYDROCHLORIDE 100 MG/1
TABLET, FILM COATED ORAL
Qty: 135 TABLET | Refills: 0 | Status: SHIPPED | OUTPATIENT
Start: 2024-01-26

## 2024-08-05 ENCOUNTER — AMB VIDEO VISIT (OUTPATIENT)
Dept: OTHER | Facility: HOSPITAL | Age: 24
End: 2024-08-05

## 2024-08-05 VITALS — HEIGHT: 63 IN | WEIGHT: 190 LBS | BODY MASS INDEX: 33.66 KG/M2

## 2024-08-05 DIAGNOSIS — J03.90 EXUDATIVE TONSILLITIS: Primary | ICD-10-CM

## 2024-08-05 PROCEDURE — ECARE PR SL URGENT CARE VIRTUAL VISIT: Performed by: PHYSICIAN ASSISTANT

## 2024-08-05 RX ORDER — AMOXICILLIN 500 MG/1
500 CAPSULE ORAL 2 TIMES DAILY
Qty: 20 CAPSULE | Refills: 0 | Status: SHIPPED | OUTPATIENT
Start: 2024-08-05 | End: 2024-08-15

## 2024-08-05 RX ORDER — ONDANSETRON 4 MG/1
4 TABLET, FILM COATED ORAL EVERY 8 HOURS PRN
Qty: 20 TABLET | Refills: 0 | Status: SHIPPED | OUTPATIENT
Start: 2024-08-05

## 2024-08-05 RX ORDER — PREDNISONE 20 MG/1
40 TABLET ORAL DAILY
Qty: 6 TABLET | Refills: 0 | Status: SHIPPED | OUTPATIENT
Start: 2024-08-05 | End: 2024-08-08

## 2024-08-05 NOTE — CARE ANYWHERE EVISITS
Visit Summary for ODRA LEYDA JONNYMARYLOU - Gender: Female - Date of Birth: 2000  Date: [id number] - Duration: 8 minutes  Patient: DORA MACEDO  Provider: Shannon Severino PA-C    Patient Contact Information  Address  [address] [place] [address] [address] [place]; [place] [address]  [phone]    Visit Topics  Cold [Added By: Self - 2024-08-05]  Headache [Added By: Self - 2024-08-05]  Fever [Added By: Self - 2024-08-05]  Flu-Like Symptoms [Added By: Self - 2024-08-05]  Stomachache [Added By: Self - 2024-08-05]    Triage Questions   What is your current physical address in the event of a medical emergency? Answer []  Are you allergic to any medications? Answer []  Are you now or could you be pregnant? Answer []  Do you have any immune system compromise or chronic lung   disease? Answer []  Do you have any vulnerable family members in the home (infant, pregnant, cancer, elderly)? Answer []     Conversation Transcripts  [0A][0A] [Notification] You are connected with Shannon Severino PA-C, Urgent Care Specialist.[0A][Notification] DORA MACEDO is located in [place].[0A][Notification] DORA MACEDO has shared health history...[0A]    Diagnosis  Acute tonsillitis, unspecified    Procedures  Value: [id number] Code: CPT-4 UNLISTED E&M SERVICE    Medications Prescribed    No prescriptions ordered    Electronically signed by: Severino PA-C, Shannon(NPI [id number])

## 2024-08-05 NOTE — PATIENT INSTRUCTIONS
"Care Anywhere Phone number is [phone] if you need assistance or have further questions  note in \"Letters\" section of [employer]ennifer. Can print if opened from a web browser    Take ibuprofen 600 mg every 6 hours  Alternate with tylenol 500-650 mg every 6 hours for more constant pain relief  Ex. Ibuprofen 9 am, tylenol 12 pm, ibuprofen 3 pm, tylenol 6 pm, etc.    Warm salt water gargles, warm tea with honey as needed    Chloraseptic spray or throat lozenges with benzocaine (cepacol max strength).    Go to the emergency department if you have worsening swelling, difficulty swallowing due to swelling, or difficulty breathing.  Please schedule follow-up appointment with your primary care physician within the next 1-2 business days for recheck.    "

## 2024-08-05 NOTE — PROGRESS NOTES
"Required Documentation:  Encounter provider: Shannon D Severino, PA-C    Identify all parties in room with patient during virtual visit:  No one else    The patient was identified by name and date of birth. Florentino Biswas was informed that this is a telemedicine visit and that the visit is being conducted through the [employer] platform. She agrees to proceed..  My office door was closed. No one else was in the room.  She acknowledged consent and understanding of privacy and security of the video platform. The patient has agreed to participate and understands they can discontinue the visit at any time.    Verification of patient location:  Patient is located at Other in the following state in which I hold an active license [place]    Patient is aware this is a billable service.     Reason for visit is No chief complaint on file.       Subjective  HPI   Pt reports Saturday started feeling feverish, severe throat pain, chills, body aches. Sister recently sick. Yesterday was feeling better, but throat hurts more today. Vomiting and nausea. Vomiting after drinking liquids. Normal voiding and BM. No concern for STI or pregnancy    Past Medical History:   Diagnosis Date    Migraine aura without headache        Past Surgical History:   Procedure Laterality Date    NO PAST SURGERIES          No Known Allergies    Review of Systems    Video Exam    Vitals:    08/05/24 1627   Weight: 86.2 kg (190 lb)   Height: 5' 3\" (1.6 m)       Physical Exam  Constitutional:       General: She is not in acute distress.     Appearance: Normal appearance. She is not toxic-appearing.   HENT:      Head: Normocephalic and atraumatic.      Nose: No rhinorrhea.      Mouth/Throat:      Mouth: Mucous membranes are moist.      Pharynx: Uvula midline. Posterior oropharyngeal erythema present. No uvula swelling.      Tonsils: Tonsillar exudate present. 2+ on the right. 2+ on the left.   Eyes:      Conjunctiva/sclera: Conjunctivae normal. " "  Pulmonary:      Effort: Pulmonary effort is normal. No respiratory distress.      Breath sounds: No wheezing (no gross audible wheeze through computer).   Musculoskeletal:      Cervical back: Normal range of motion.   Skin:     Findings: No rash (on face or neck).   Neurological:      Mental Status: She is alert.      Cranial Nerves: No dysarthria or facial asymmetry.   Psychiatric:         Mood and Affect: Mood normal.         Behavior: Behavior normal.         Visit Time  Total Visit Duration: 10 minutes    Assessment/Plan:    Diagnoses and all orders for this visit:    Exudative tonsillitis  -     predniSONE 20 mg tablet; Take 2 tablets (40 mg total) by mouth daily for 3 days  -     ondansetron (ZOFRAN) 4 mg tablet; Take 1 tablet (4 mg total) by mouth every 8 (eight) hours as needed for nausea or vomiting  -     amoxicillin (AMOXIL) 500 mg capsule; Take 1 capsule (500 mg total) by mouth 2 (two) times a day for 10 days        Patient Instructions   [facility] Phone number is [phone] if you need assistance or have further questions  note in \"Letters\" section of [employer]. Can print if opened from a web browser    Take ibuprofen 600 mg every 6 hours  Alternate with tylenol 500-650 mg every 6 hours for more constant pain relief  Ex. Ibuprofen 9 am, tylenol 12 pm, ibuprofen 3 pm, tylenol 6 pm, etc.    Warm salt water gargles, warm tea with honey as needed    Chloraseptic spray or throat lozenges with benzocaine (cepacol max strength).    Go to the emergency department if you have worsening swelling, difficulty swallowing due to swelling, or difficulty breathing.  Please schedule follow-up appointment with your primary care physician within the next 1-2 business days for recheck.    "

## 2024-08-31 DIAGNOSIS — F41.9 ANXIETY: ICD-10-CM

## 2024-09-03 RX ORDER — SERTRALINE HYDROCHLORIDE 100 MG/1
150 TABLET, FILM COATED ORAL DAILY
Qty: 135 TABLET | Refills: 0 | OUTPATIENT
Start: 2024-09-03

## 2024-09-06 ENCOUNTER — OFFICE VISIT (OUTPATIENT)
Dept: FAMILY MEDICINE CLINIC | Facility: CLINIC | Age: 24
End: 2024-09-06
Payer: COMMERCIAL

## 2024-09-06 VITALS
BODY MASS INDEX: 36.68 KG/M2 | WEIGHT: 207 LBS | HEART RATE: 98 BPM | RESPIRATION RATE: 16 BRPM | OXYGEN SATURATION: 98 % | HEIGHT: 63 IN | TEMPERATURE: 98 F | SYSTOLIC BLOOD PRESSURE: 100 MMHG | DIASTOLIC BLOOD PRESSURE: 80 MMHG

## 2024-09-06 DIAGNOSIS — Z00.00 PHYSICAL EXAM, ANNUAL: Primary | ICD-10-CM

## 2024-09-06 DIAGNOSIS — F41.9 ANXIETY: ICD-10-CM

## 2024-09-06 DIAGNOSIS — Z13.220 LIPID SCREENING: ICD-10-CM

## 2024-09-06 DIAGNOSIS — G43.829 MENSTRUAL MIGRAINE WITHOUT STATUS MIGRAINOSUS, NOT INTRACTABLE: ICD-10-CM

## 2024-09-06 PROCEDURE — 99395 PREV VISIT EST AGE 18-39: CPT | Performed by: NURSE PRACTITIONER

## 2024-09-06 PROCEDURE — 99213 OFFICE O/P EST LOW 20 MIN: CPT | Performed by: NURSE PRACTITIONER

## 2024-09-06 RX ORDER — SERTRALINE HYDROCHLORIDE 100 MG/1
150 TABLET, FILM COATED ORAL DAILY
Qty: 135 TABLET | Refills: 3 | Status: SHIPPED | OUTPATIENT
Start: 2024-09-06

## 2024-09-06 NOTE — PROGRESS NOTES
[facility] HEALTH MAINTENANCE OFFICE VISIT  [facility]    NAME: Florentino Funk  AGE: 24 y.o. SEX: female  : 2000     DATE: 2024    Assessment and Plan     1. Physical exam, annual  2. Anxiety  Assessment & Plan:  Well controlled on sertraline, no further panic attacks.     Orders:  -     sertraline (ZOLOFT) 100 mg tablet; Take 1.5 tablets (150 mg total) by mouth daily  -     CBC; Future  -     Comprehensive metabolic panel; Future  -     TSH, 3rd generation; Future  3. Menstrual migraine without status migrainosus, not intractable  Assessment & Plan:  Maxalt works well for migraines. About 2 migraines per month associated with menses.  Zofran as needed with nausea associated with migraines.   She is on OCP, Robyn, as well.   She does not need refills for Zofran or Maxalt right now, but will let me know when she needs refills.  No longer following with neurology.   4. Lipid screening  -     Lipid panel; Future      Patient Counseling:   Nutrition: Stressed importance of a well balanced diet, moderation of sodium/saturated fat, caloric balance and sufficient intake of fiber  Exercise: Stressed the importance of regular exercise with a goal of 150 minutes per week  Dental Health: Discussed daily flossing and brushing and regular dental visits     Immunizations reviewed: Declined recommended vaccinations, Tdap.  Discussed benefits of:  Cervical Cancer screening up to date, labs ordered.    Follow up in one year or sooner if needed.         Chief Complaint     Chief Complaint   Patient presents with    Well Check       History of Present Illness     Florentino Funk is a 24 year old female presenting today for annual exam and medication refills.     She is feeling well and has no complaints today.     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              Well Adult Physical   Patient here for a comprehensive physical  exam.      Diet and Physical Activity  Diet: well balanced diet  Exercise: frequently      Depression Screen  PHQ-2/9 Depression Screening    Little interest or pleasure in doing things: 1 - several days  Feeling down, depressed, or hopeless: 1 - several days  PHQ-2 Score: 2  PHQ-2 Interpretation: Negative depression screen          General Health  Hearing: Normal:  bilateral  Vision: no vision problems  Dental: regular dental visits    Reproductive Health  Follows with gynecologist      The following portions of the patient's history were reviewed and updated as appropriate: allergies, current medications, past family history, past medical history, past social history, past surgical history and problem list.    Review of Systems     Review of Systems   Constitutional: Negative.    HENT: Negative.     Respiratory: Negative.     Cardiovascular: Negative.    Gastrointestinal: Negative.    Genitourinary: Negative.    Musculoskeletal: Negative.    Skin: Negative.    Neurological:  Positive for headaches.   Hematological: Negative.    Psychiatric/Behavioral: Negative.         Past Medical History     Past Medical History:   Diagnosis Date    Migraine aura without headache        Past Surgical History     Past Surgical History:   Procedure Laterality Date    NO PAST SURGERIES         Social History     Social History     Socioeconomic History    Marital status: Single     Spouse name: None    Number of children: None    Years of education: None    Highest education level: None   Occupational History    None   Tobacco Use    Smoking status: Never    Smokeless tobacco: Never   Vaping Use    Vaping status: Never Used   Substance and Sexual Activity    Alcohol use: Yes     Comment: rare    Drug use: Never    Sexual activity: Not Currently     Partners: Male     Birth control/protection: OCP   Other Topics Concern    None   Social History Narrative    Living with single parent-Lives with mother,mother's fiance,patient's  brother,and the son of mother'fifi allen     Social Determinants of Health     Financial Resource Strain: Low Risk  (7/26/2022)    Overall Financial Resource Strain (CARDIA)     Difficulty of Paying Living Expenses: Not hard at all   Food Insecurity: No Food Insecurity (7/26/2022)    Hunger Vital Sign     Worried About Running Out of Food in the Last Year: Never true     Ran Out of Food in the Last Year: Never true   Transportation Needs: No Transportation Needs (7/26/2022)    PRAPARE - Transportation     Lack of Transportation (Medical): No     Lack of Transportation (Non-Medical): No   Physical Activity: Not on file   Stress: Not on file   Social Connections: Not on file   Intimate Partner Violence: Not on file   Housing Stability: Low Risk  (7/26/2022)    Housing Stability Vital Sign     Unable to Pay for Housing in the Last Year: No     Number of Places Lived in the Last Year: 1     Unstable Housing in the Last Year: No       Family History     Family History   Problem Relation Age of Onset    Anemia Mother     Asthma Mother     No Known Problems Father     Allergies Family     Cancer Family     Diabetes Family         mellitus    Heart disease Family     Hypertension Family     Stroke Family        Current Medications       Current Outpatient Medications:     drospirenone-ethinyl estradiol (MARIUSZ) 3-0.02 MG per tablet, Take 1 tablet by mouth daily, Disp: 84 tablet, Rfl: 11    ondansetron (ZOFRAN) 4 mg tablet, Take 1 tablet (4 mg total) by mouth every 8 (eight) hours as needed for nausea or vomiting, Disp: 20 tablet, Rfl: 0    rizatriptan (Maxalt-MLT) 10 mg disintegrating tablet, Take 1 tablet (10 mg total) by mouth as needed for migraine Take at the onset of migraine; if symptoms persists may take another dose at least 2 hours after first dose. Take no more than 2 doses in a day., Disp: 18 tablet, Rfl: 0    sertraline (ZOLOFT) 100 mg tablet, Take 1.5 tablets (150 mg total) by mouth daily, Disp: 135 tablet, Rfl: 3  "    Allergies     No Known Allergies    Objective     /80   Pulse 98   Temp 98 °F (36.7 °C) (Temporal)   Resp 16   Ht 5' 3\" (1.6 m)   Wt 93.9 kg (207 lb)   LMP 08/27/2024 (Approximate)   SpO2 98%   BMI 36.67 kg/m²      Physical Exam  Vitals and nursing note reviewed.   Constitutional:       General: She is not in acute distress.     Appearance: Normal appearance. She is well-developed. She is not ill-appearing.   HENT:      Head: Normocephalic and atraumatic.      Right Ear: Tympanic membrane normal.      Left Ear: Tympanic membrane normal.      Mouth/Throat:      Mouth: Oropharynx is clear and moist. Mucous membranes are moist.      Pharynx: Oropharynx is clear.   Eyes:      Extraocular Movements: EOM normal.      Conjunctiva/sclera: Conjunctivae normal.      Pupils: Pupils are equal, round, and reactive to light.   Neck:      Thyroid: No thyromegaly.   Cardiovascular:      Rate and Rhythm: Normal rate and regular rhythm.      Heart sounds: No murmur heard.  Pulmonary:      Effort: Pulmonary effort is normal.      Breath sounds: Normal breath sounds.   Abdominal:      General: Bowel sounds are normal.      Palpations: Abdomen is soft.      Tenderness: There is no abdominal tenderness.   Musculoskeletal:         General: No deformity.      Cervical back: Normal range of motion and neck supple.      Right lower leg: No edema.      Left lower leg: No edema.   Lymphadenopathy:      Cervical: No cervical adenopathy.   Skin:     Findings: No rash.   Neurological:      Mental Status: She is alert and oriented to person, place, and time.   Psychiatric:         Mood and Affect: Mood and affect and mood normal.                 RAFY Travis  [facility]  "

## 2024-09-06 NOTE — ASSESSMENT & PLAN NOTE
Maxalt works well for migraines. About 2 migraines per month associated with menses.  Zofran as needed with nausea associated with migraines.   She is on OCP, Robyn, as well.   She does not need refills for Zofran or Maxalt right now, but will let me know when she needs refills.  No longer following with neurology.

## 2024-09-09 ENCOUNTER — TELEPHONE (OUTPATIENT)
Dept: ADMINISTRATIVE | Facility: OTHER | Age: 24
End: 2024-09-09

## 2024-09-09 NOTE — TELEPHONE ENCOUNTER
----- Message from RAFY Loya sent at 9/6/2024  9:28 PM EDT -----  Regarding: pap  09/06/24 9:29 PM    Hello, our patient Florentino Funk has had Pap Smear (HPV) aka Cervical Cancer Screening completed/performed. Please assist in updating the patient chart by pulling the document from lab Tab within Chart Review. The date of service is 8/16/23.     Thank you,  RAFY Travis  [facility]

## 2024-09-10 NOTE — TELEPHONE ENCOUNTER
Upon review of the In Basket request we were able to identify that the patient had the requested item(s) completed internally. Internal labs/procedures/tests are not able to be linked to . HM will show as updated but a hyperlink to the document is not possible at this time. The item(s) requested can be found within the Chart Review tabs.     Any additional questions or concerns should be emailed to the Practice Liaisons via the appropriate education email address, please do not reply via In Basket.    Thank you  Dago Garcia MA   [facility]

## 2024-09-30 DIAGNOSIS — G43.829 MENSTRUAL MIGRAINE WITHOUT STATUS MIGRAINOSUS, NOT INTRACTABLE: Primary | ICD-10-CM

## 2024-09-30 DIAGNOSIS — J03.90 EXUDATIVE TONSILLITIS: ICD-10-CM

## 2024-09-30 NOTE — TELEPHONE ENCOUNTER
Medication: Zofran    Dose/Frequency: 4 mg - take 1 tablet (4mg total) by mouth every 8 (eight) hours as needed for nausea or vomiting    Quantity: 20    Pharm[facility]: [facility]    Office:   [x] PCP/Provider -   [] Speciality/Provider -     Does the patient have enough for 3 days?   [x] Yes   [] No - Send as HP to POD

## 2024-10-01 RX ORDER — ONDANSETRON 4 MG/1
4 TABLET, FILM COATED ORAL EVERY 8 HOURS PRN
Qty: 20 TABLET | Refills: 0 | Status: SHIPPED | OUTPATIENT
Start: 2024-10-01

## 2024-11-25 DIAGNOSIS — F41.9 ANXIETY: Primary | ICD-10-CM

## 2024-11-25 RX ORDER — SERTRALINE HYDROCHLORIDE 100 MG/1
200 TABLET, FILM COATED ORAL DAILY
Qty: 200 TABLET | Refills: 2 | Status: SHIPPED | OUTPATIENT
Start: 2024-11-25

## 2024-11-25 RX ORDER — PROPRANOLOL HYDROCHLORIDE 10 MG/1
10 TABLET ORAL 3 TIMES DAILY PRN
Qty: 60 TABLET | Refills: 0 | Status: SHIPPED | OUTPATIENT
Start: 2024-11-25

## 2024-12-20 DIAGNOSIS — G43.829 MENSTRUAL MIGRAINE WITHOUT STATUS MIGRAINOSUS, NOT INTRACTABLE: ICD-10-CM

## 2024-12-20 RX ORDER — RIZATRIPTAN BENZOATE 10 MG/1
TABLET, ORALLY DISINTEGRATING ORAL
Qty: 18 TABLET | Refills: 1 | Status: SHIPPED | OUTPATIENT
Start: 2024-12-20

## 2025-01-24 ENCOUNTER — OFFICE VISIT (OUTPATIENT)
Dept: URGENT CARE | Facility: CLINIC | Age: 25
End: 2025-01-24
Payer: COMMERCIAL

## 2025-01-24 VITALS
TEMPERATURE: 100.2 F | WEIGHT: 196 LBS | DIASTOLIC BLOOD PRESSURE: 94 MMHG | OXYGEN SATURATION: 97 % | RESPIRATION RATE: 16 BRPM | BODY MASS INDEX: 34.72 KG/M2 | SYSTOLIC BLOOD PRESSURE: 116 MMHG | HEART RATE: 120 BPM

## 2025-01-24 DIAGNOSIS — J11.1 FLU: ICD-10-CM

## 2025-01-24 DIAGNOSIS — R50.81 FEVER IN OTHER DISEASES: Primary | ICD-10-CM

## 2025-01-24 PROCEDURE — S9083 URGENT CARE CENTER GLOBAL: HCPCS

## 2025-01-24 PROCEDURE — 87636 SARSCOV2 & INF A&B AMP PRB: CPT

## 2025-01-24 PROCEDURE — G0382 LEV 3 HOSP TYPE B ED VISIT: HCPCS

## 2025-01-24 RX ORDER — OSELTAMIVIR PHOSPHATE 75 MG/1
75 CAPSULE ORAL EVERY 12 HOURS SCHEDULED
Qty: 10 CAPSULE | Refills: 0 | Status: SHIPPED | OUTPATIENT
Start: 2025-01-24 | End: 2025-01-29

## 2025-01-24 NOTE — PROGRESS NOTES
[facility]        NAME: Florentino Funk is a 24 y.o. female  : 2000    MRN: [id number]  DATE: 2025  TIME: 5:14 PM    Assessment and Plan   Fever in other diseases [R50.81]  1. Fever in other diseases  Covid/Flu- Office Collect Normal    Covid/Flu- Office Collect Normal      2. Flu  oseltamivir (TAMIFLU) 75 mg capsule            Patient Instructions     Saline nasal spray as often as needed in each nostril  Flonase nasal spray 1 spray to each nostril daily.  She can increase that to twice a day    If needed Mucinex in the blue box as directed    Increase your fluids  Tylenol Motrin for fever or discomfort    If your flu comes back positive we will start you on Tamiflu.  Follow up with PCP in 3-5 days.  Proceed to  ER if symptoms worsen.    If tests have been performed at [facility], our office will contact you with results if changes need to be made to the care plan discussed with you at the visit.  [facility]    Chief Complaint     Chief Complaint   Patient presents with    Cough     Started last night with Dry Cough, sore throat (from cough), fever, body aches, chills         History of Present Illness       This is a 24-year-old female who presents today with cough and fever that started last night.  She does have postnasal drip.  She states that she has been taking Mucinex and Tylenol with no relief.  Her parents were both sick last week dad tested negative for COVID flu and strep.  Sister is also sick at this time with the same symptoms.    Cough  Associated symptoms include headaches and postnasal drip. Pertinent negatives include no sore throat or shortness of breath.       Review of Systems   Review of Systems   Constitutional: Negative.    HENT:  Positive for congestion and postnasal drip. Negative for sore throat.    Respiratory:  Positive for cough. Negative for shortness of breath.    Cardiovascular: Negative.     Gastrointestinal: Negative.    Genitourinary: Negative.    Musculoskeletal: Negative.    Neurological:  Positive for headaches.         Current Medications       Current Outpatient Medications:     drospirenone-ethinyl estradiol (MARIUSZ) 3-0.02 MG per tablet, Take 1 tablet by mouth daily, Disp: 84 tablet, Rfl: 11    ondansetron (ZOFRAN) 4 mg tablet, Take 1 tablet (4 mg total) by mouth every 8 (eight) hours as needed for nausea or vomiting, Disp: 20 tablet, Rfl: 0    oseltamivir (TAMIFLU) 75 mg capsule, Take 1 capsule (75 mg total) by mouth every 12 (twelve) hours for 5 days, Disp: 10 capsule, Rfl: 0    propranolol (INDERAL) 10 mg tablet, Take 1 tablet (10 mg total) by mouth 3 (three) times a day as needed (for anxiety), Disp: 60 tablet, Rfl: 0    rizatriptan (Maxalt-MLT) 10 mg disintegrating tablet, Take at the onset of migraine; if symptoms persists may take another dose at least 2 hours after first dose. Take no more than 2 doses in a day., Disp: 18 tablet, Rfl: 1    sertraline (ZOLOFT) 100 mg tablet, Take 2 tablets (200 mg total) by mouth daily, Disp: 200 tablet, Rfl: 2    Current Allergies     Allergies as of 01/24/2025    (No Known Allergies)            The following portions of the patient's history were reviewed and updated as appropriate: allergies, current medications, past family history, past medical history, past social history, past surgical history and problem list.     Past Medical History:   Diagnosis Date    Migraine aura without headache        Past Surgical History:   Procedure Laterality Date    NO PAST SURGERIES         Family History   Problem Relation Age of Onset    Anemia Mother     Asthma Mother     No Known Problems Father     Allergies Family     Cancer Family     Diabetes Family         mellitus    Heart disease Family     Hypertension Family     Stroke Family          Medications have been verified.        Objective   /94   Pulse (!) 120   Temp 100.2 °F (37.9 °C)   Resp 16   Wt  88.9 kg (196 lb)   SpO2 97%   BMI 34.72 kg/m²   No LMP recorded.       Physical Exam     Physical Exam  Vitals reviewed.   Constitutional:       General: She is not in acute distress.     Appearance: Normal appearance. She is not ill-appearing.   HENT:      Head: Normocephalic and atraumatic.      Nose: Congestion present.      Mouth/Throat:      Pharynx: Posterior oropharyngeal erythema present.   Eyes:      Extraocular Movements: Extraocular movements intact.      Conjunctiva/sclera: Conjunctivae normal.   Cardiovascular:      Rate and Rhythm: Normal rate and regular rhythm.      Pulses: Normal pulses.      Heart sounds: Normal heart sounds.   Pulmonary:      Effort: Pulmonary effort is normal.   Abdominal:      General: Abdomen is flat. Bowel sounds are normal.   Musculoskeletal:         General: Normal range of motion.      Cervical back: Normal range of motion and neck supple.   Skin:     General: Skin is warm.      Capillary Refill: Capillary refill takes less than 2 seconds.   Neurological:      General: No focal deficit present.      Mental Status: She is alert and oriented to person, place, and time.   Psychiatric:         Mood and Affect: Mood normal.         Behavior: Behavior normal.         Judgment: Judgment normal.

## 2025-01-24 NOTE — PATIENT INSTRUCTIONS
Saline nasal spray as often as needed in each nostril  Flonase nasal spray 1 spray to each nostril daily.  She can increase that to twice a day    If needed Mucinex in the blue box as directed    Increase your fluids  Tylenol Motrin for fever or discomfort    If your flu comes back positive we will start you on Tamiflu.

## 2025-01-27 ENCOUNTER — RESULTS FOLLOW-UP (OUTPATIENT)
Dept: URGENT CARE | Facility: CLINIC | Age: 25
End: 2025-01-27

## 2025-01-27 LAB
FLUAV RNA RESP QL NAA+PROBE: POSITIVE
FLUBV RNA RESP QL NAA+PROBE: NEGATIVE
SARS-COV-2 RNA RESP QL NAA+PROBE: NEGATIVE

## 2025-01-27 NOTE — TELEPHONE ENCOUNTER
Message left on message that her Influenza A was positive, patient did see results on the My Chart [facility]. If she has questions she can call back

## 2025-03-07 DIAGNOSIS — G43.829 MENSTRUAL MIGRAINE WITHOUT STATUS MIGRAINOSUS, NOT INTRACTABLE: ICD-10-CM

## 2025-03-10 RX ORDER — ONDANSETRON 4 MG/1
4 TABLET, FILM COATED ORAL EVERY 8 HOURS PRN
Qty: 20 TABLET | Refills: 0 | Status: SHIPPED | OUTPATIENT
Start: 2025-03-10

## 2025-07-10 ENCOUNTER — OFFICE VISIT (OUTPATIENT)
Dept: NEUROLOGY | Facility: CLINIC | Age: 25
End: 2025-07-10
Payer: COMMERCIAL

## 2025-07-10 VITALS
OXYGEN SATURATION: 97 % | HEART RATE: 93 BPM | WEIGHT: 202 LBS | DIASTOLIC BLOOD PRESSURE: 70 MMHG | SYSTOLIC BLOOD PRESSURE: 106 MMHG | HEIGHT: 63 IN | BODY MASS INDEX: 35.79 KG/M2

## 2025-07-10 DIAGNOSIS — G43.009 MIGRAINE WITHOUT AURA AND WITHOUT STATUS MIGRAINOSUS, NOT INTRACTABLE: Primary | ICD-10-CM

## 2025-07-10 PROCEDURE — 99214 OFFICE O/P EST MOD 30 MIN: CPT | Performed by: STUDENT IN AN ORGANIZED HEALTH CARE EDUCATION/TRAINING PROGRAM

## 2025-07-10 RX ORDER — RIMEGEPANT SULFATE 75 MG/75MG
TABLET, ORALLY DISINTEGRATING ORAL
Qty: 8 TABLET | Refills: 3 | Status: SHIPPED | OUTPATIENT
Start: 2025-07-10

## 2025-07-10 NOTE — PROGRESS NOTES
Name: Florentino Funk      : 2000      MRN: [id number]  Encounter Provider: Olivia Eric MD  Encounter Date: 7/10/2025   Encounter department: [facility]  :  Assessment & Plan  Migraine without aura and without status migrainosus, not intractable  Patient with migraines no longer restricted to menses. Used to be 2-3 migraines per month only during menstrual cycle, but now approximately 5 per month with atleast 2 not associated with her cycle.    Symptoms: 10/10 headache behind her eyes that can last all day with associated nausea, photophobia, phonophobia, and no associated aura    Maxalt aborts migraine pain but does not prevent disability or improve quality of life as patient has to lie down rest of the day and miss work.     Possible preventives if frequency worsens: not amenable to topamax side effect profile. Blood pressure would not tolerate beta blocker scheduled. TCA would not be a good option as patient is already on sertraline with mood well managed. Qulipta would be a good option to consider if frequency increases to 10 or more migraines per month.    Abortive: d/c maxalt, utilize nurtec 75 mg prn  Follow up in 4 months    Orders:    rimegepant sulfate (Nurtec) 75 mg TBDP; One tab as needed for migraine, max one per day, max 3 days per week          History of Present Illness   25 yo female last seen 24 here to re-establish care. She reports that her migraine symptoms remain the same but occur more frequently. She used to have 2-3 per month, only during her menstrual cycle, but now she will have approximately 5 per month atleast 2 of which are not associated with her cycle. She has not discovered a specific trigger for these migraines. She typically has 10/10 headache behind her eyes that can last all day with associated nausea, photophobia, phonophobia, and no associated aura. She always carries her abortive with her to take as soon as symptoms start.  "Unfortunately, Maxalt has been making her feel very \"wiped out\" each time she takes it. While the pain is aborted, her quality of life during migraines is not improving because she has to lie down and rest after taking it, so she cannot work on those days. She is on board with trying Nurtec as abortive agent instead. She does not require preventive at this time but per discussion would not be comfortable with topamax based on side effects, would be open to Qulipta however if frequency worsens.            Review of Systems   Gastrointestinal:  Positive for nausea.   Neurological:  Positive for headaches. Negative for dizziness, tremors, seizures, syncope, facial asymmetry, speech difficulty, weakness, light-headedness and numbness.    I have personally reviewed the MA's review of systems and made changes as necessary.         Objective   /70 (BP Location: Left arm, Patient Position: Sitting, Cuff Size: Standard)   Pulse 93   Ht 5' 3\" (1.6 m)   Wt 91.6 kg (202 lb)   SpO2 97%   BMI 35.78 kg/m²     Physical Exam  Constitutional:       General: She is awake.     Eyes:      Extraocular Movements: Extraocular movements intact.      Pupils: Pupils are equal, round, and reactive to light.       Neurological:      Mental Status: She is alert.      Motor: Motor strength is normal.    Psychiatric:         Speech: Speech normal.     Neurological Exam  Mental Status  Awake and alert. Oriented to person, place and time. Speech is normal. Language is fluent with no aphasia.    Cranial Nerves  CN II: Visual fields full to confrontation.  CN III, IV, VI: Extraocular movements intact bilaterally. Pupils equal round and reactive to light bilaterally.  CN V: Facial sensation is normal.  CN VII: Full and symmetric facial movement.  CN XI: Shoulder shrug strength is normal.  CN XII: Tongue midline without atrophy or fasciculations.    Motor   Strength is 5/5 throughout all four extremities.    Sensory  Light touch is normal in " upper and lower extremities.     Coordination  Right: Finger-to-nose normal. Heel-to-shin normal.Left: Finger-to-nose normal. Heel-to-shin normal.

## 2025-07-14 ENCOUNTER — TELEPHONE (OUTPATIENT)
Age: 25
End: 2025-07-14

## 2025-07-15 NOTE — TELEPHONE ENCOUNTER
PA for (Nurtec) 75 mg TBDP SUBMITTED to     via      [x][employer]      [x]PA sent as URGENT    All office notes, labs and other pertaining documents and studies sent. Clinical questions answered. Awaiting determination from insurance company.     Turnaround time for your insurance to make a decision on your Prior Authorization can take 7-21 business days.